# Patient Record
Sex: FEMALE | Race: WHITE | NOT HISPANIC OR LATINO
[De-identification: names, ages, dates, MRNs, and addresses within clinical notes are randomized per-mention and may not be internally consistent; named-entity substitution may affect disease eponyms.]

---

## 2021-11-12 PROBLEM — Z00.00 ENCOUNTER FOR PREVENTIVE HEALTH EXAMINATION: Status: ACTIVE | Noted: 2021-11-12

## 2021-11-29 ENCOUNTER — APPOINTMENT (OUTPATIENT)
Dept: BREAST CENTER | Facility: CLINIC | Age: 41
End: 2021-11-29
Payer: COMMERCIAL

## 2021-11-29 ENCOUNTER — NON-APPOINTMENT (OUTPATIENT)
Age: 41
End: 2021-11-29

## 2021-11-29 VITALS — HEIGHT: 61 IN | BODY MASS INDEX: 21.71 KG/M2 | WEIGHT: 115 LBS

## 2021-11-29 DIAGNOSIS — Z86.011 PERSONAL HISTORY OF BENIGN NEOPLASM OF THE BRAIN: ICD-10-CM

## 2021-11-29 DIAGNOSIS — R92.0 MAMMOGRAPHIC MICROCALCIFICATION FOUND ON DIAGNOSTIC IMAGING OF BREAST: ICD-10-CM

## 2021-11-29 PROCEDURE — 99204 OFFICE O/P NEW MOD 45 MIN: CPT

## 2021-11-29 PROCEDURE — 99072 ADDL SUPL MATRL&STAF TM PHE: CPT

## 2021-11-29 NOTE — PHYSICAL EXAM
[No Supraclavicular Adenopathy] : no supraclavicular adenopathy [No Cervical Adenopathy] : no cervical adenopathy [Clear to Auscultation Bilat] : clear to auscultation bilaterally [Normal Sinus Rhythm] : normal sinus rhythm [Normal S1, S2] : normal S1 and S2 [No Rubs] : no pericardial rub [Examined in the supine and seated position] : examined in the supine and seated position [Symmetrical] : symmetrical [No dominant masses] : no dominant masses in right breast  [No dominant masses] : no dominant masses left breast [No Nipple Retraction] : no left nipple retraction [No Nipple Discharge] : no left nipple discharge [No Axillary Lymphadenopathy] : no left axillary lymphadenopathy [Soft] : abdomen soft [Not Tender] : non-tender [No Hepato-Splenomegaly] : no hepato-splenomegaly [No Rashes] : no rashes [de-identified] : Implants appear intact.

## 2021-11-29 NOTE — HISTORY OF PRESENT ILLNESS
[FreeTextEntry1] : 41-year-old premenopausal woman presents after getting her first mammogram and ultrasound which showed some pleomorphic calcifications in the central left breast.  Ultrasound showed multiple cysts and benign nodules, BI-RADS 3.  Patient denied any breast masses or nipple discharge or bone pain at this time.  Patient underwent a left stereotactic core biopsy which revealed a poorly differentiated ductal carcinoma that was ER/OH positive HER-2 negative with a Ki-67 of 15%.  Patient has no family history of breast cancer although she says she is not sure that they would have told her if she had.  2017 she had a polypectomy for uterine polyps without malignancy.  This is the patient's first breast biopsy and she is never taken hormones.  2016 she had bilateral saline implants placed.

## 2021-12-01 ENCOUNTER — NON-APPOINTMENT (OUTPATIENT)
Age: 41
End: 2021-12-01

## 2021-12-01 DIAGNOSIS — R92.1 MAMMOGRAPHIC CALCIFICATION FOUND ON DIAGNOSTIC IMAGING OF BREAST: ICD-10-CM

## 2021-12-06 ENCOUNTER — APPOINTMENT (OUTPATIENT)
Dept: HEMATOLOGY ONCOLOGY | Facility: CLINIC | Age: 41
End: 2021-12-06

## 2021-12-07 ENCOUNTER — NON-APPOINTMENT (OUTPATIENT)
Age: 41
End: 2021-12-07
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
## 2021-12-09 ENCOUNTER — NON-APPOINTMENT (OUTPATIENT)
Age: 41
End: 2021-12-09

## 2021-12-13 ENCOUNTER — NON-APPOINTMENT (OUTPATIENT)
Age: 41
End: 2021-12-13

## 2021-12-20 ENCOUNTER — NON-APPOINTMENT (OUTPATIENT)
Age: 41
End: 2021-12-20

## 2021-12-20 NOTE — DISCUSSION/SUMMARY
[FreeTextEntry1] : REASON FOR CONSULT\par Jennifer Stout is a 41-year-old female who was contacted on December 20, 2021 for a discussion regarding her negative genetic testing results related to hereditary cancer predisposition. This session was conducted via telephone. \par \par Ms. Stout was originally seen by the Cancer Genetics Service on December 6, 2021 for hereditary cancer predisposition risk assessment due to a new diagnosis of breast cancer. At that time, Ms. Stout decided to pursue genetic testing using [facility]’s breast STAT panel.\par \par INTERVAL HISTORY\par Upon completion of the STAT panel, Ms. Stout was re-contacted on December 13, 2021 regarding her results and consented to pursue additional genetic testing for genes associated with breast, gynecological, and brain cancer given her personal and family history.\par \par TEST RESULTS: NEGATIVE\par NO pathogenic (disease-causing) variants or variants of uncertain significance were detected in any of the following genes [39]:  AIP, ALK, APC, MARION, BARD1, BRCA1, BRCA2, BRIP1, CDH1, CHEK2, DICER1, EPCAM, HRAS, LZTR1, MEN1, MLH1, MSH2, MSH6, NBN, NF1, NF2, PALB2, PHOX2B, PMS2, LLMDK9P, PTCH1, PTEN, RAD51C, RAD51D, RB1, SMARCA4, SMARCB1, SMARCE1, STK11, SUFU, TP53, TSC1, TSC2, and VHL.\par \par RESULTS INTERPRETATION AND ASSESSMENT:\par Given Ms. Stout’s personal and current reported family history of cancer, and her negative genetic test results, the following screening guidelines and risk-reducing recommendations were discussed:\par \par BREAST: \par •	It was discussed Ms. Stout’s surgical plan should not be impacted by these negative genetic testing results.\par •	Long-term management and surveillance should be based on Ms. Stout’s on- or post-treatment protocol as recommended by her breast care team. \par \par OTHER:\par •	In the absence of other indications, Ms. Stout should practice age-appropriate cancer screening of other organ systems as recommended for the general population.\par \par \par We also discussed the limitations of negative results:\par 1.	The cause of Ms. Stout’s personal and family history of cancer remains unknown. The cancer(s) may have developed randomly, or due to environmental factors.  \par 2.	This negative result does not completely rule out a hereditary basis for the reported personal and/or family history due to limitations in technology or a variant being present in an unidentified gene. \par 3.	Variants in other genes would not be identified by this analysis, so this negative result does not rule out the likelihood of having a mutation in a different hereditary cancer gene or the possibility of ever developing cancer.\par 4.	It is possible there is a hereditary cancer predisposition gene mutation in the family, but the patient did not inherit it. \par \par We informed Ms. Stout that our knowledge of genetics and inherited cancer conditions is changing rapidly. Therefore, we recommended that Ms. Stout contact our office, every 2 to 3 years, to discuss relevant advances in cancer genetics.  We emphasized the importance of re-contacting us with updates regarding her personal and family history of cancer as well as any updates regarding additional cancer genetic test results performed for the patient and/or family members.  Such updates could possibly change our risk assessment and recommendations. \par \par PLAN:\par 1.	These results do not change Ms. Stout’s medical management. Long-term management and surveillance should be based on the patient’s on- or post-treatment protocol as recommended by her breast care team (and general population guidelines for other cancers).\par 2.	A copy of genetic testing results and clinic note will be sent to Ms. Stout.\par 3.	Ms. Stout was encouraged to contact us every 2-3 years to discuss relevant advances in cancer genetics, or sooner if there are any changes in her personal or family history of cancer.\par \par \par For any additional questions please call Cancer Genetics at [phone]. \par \par \par Aileen Perez MS, [facility] [place]\par Genetic Counselor, Cancer Genetics\par \par \par CC: \par Jennifer Girish\par Abner Ross MD\par \par \par \par \par

## 2022-01-21 ENCOUNTER — RESULT REVIEW (OUTPATIENT)
Age: 42
End: 2022-01-21

## 2022-01-31 ENCOUNTER — APPOINTMENT (OUTPATIENT)
Dept: BREAST CENTER | Facility: CLINIC | Age: 42
End: 2022-01-31
Payer: COMMERCIAL

## 2022-01-31 VITALS
BODY MASS INDEX: 21.73 KG/M2 | WEIGHT: 115 LBS | DIASTOLIC BLOOD PRESSURE: 82 MMHG | SYSTOLIC BLOOD PRESSURE: 139 MMHG | HEART RATE: 72 BPM | OXYGEN SATURATION: 99 %

## 2022-01-31 PROCEDURE — 99214 OFFICE O/P EST MOD 30 MIN: CPT

## 2022-02-01 NOTE — PHYSICAL EXAM
[de-identified] : Patient is healing well with some ecchymosis status post bilateral breast biopsies.  Implants appear intact.

## 2022-02-01 NOTE — HISTORY OF PRESENT ILLNESS
[FreeTextEntry1] : 41-year-old premenopausal woman presents after getting her first mammogram and ultrasound which showed some pleomorphic calcifications in the central left breast.  Ultrasound showed multiple cysts and benign nodules, BI-RADS 3.  Patient denied any breast masses or nipple discharge or bone pain at this time.  Patient underwent a left stereotactic core biopsy which revealed a poorly differentiated ductal carcinoma that was ER/MN positive HER-2 negative with a Ki-67 of 15%.  Patient has no family history of breast cancer although she says she is not sure that they would have told her if she had.  2017 she had a polypectomy for uterine polyps without malignancy.  This is the patient's first breast biopsy and she is never taken hormones.  2016 she had bilateral saline implants placed.\par \par Since last seeing the patient patient gene tested negative, had a left breast stereotactic core biopsy, benign and a right breast MRI guided core biopsy benign.  Patient comes in now for discussion of treating her breast cancer.\par

## 2022-03-11 ENCOUNTER — APPOINTMENT (OUTPATIENT)
Dept: BREAST CENTER | Facility: HOSPITAL | Age: 42
End: 2022-03-11

## 2022-03-22 ENCOUNTER — APPOINTMENT (OUTPATIENT)
Dept: BREAST CENTER | Facility: CLINIC | Age: 42
End: 2022-03-22
Payer: COMMERCIAL

## 2022-03-22 VITALS
HEART RATE: 83 BPM | SYSTOLIC BLOOD PRESSURE: 114 MMHG | DIASTOLIC BLOOD PRESSURE: 72 MMHG | WEIGHT: 118 LBS | HEIGHT: 61 IN | BODY MASS INDEX: 22.28 KG/M2

## 2022-03-22 PROCEDURE — 99024 POSTOP FOLLOW-UP VISIT: CPT

## 2022-03-22 NOTE — HISTORY OF PRESENT ILLNESS
[FreeTextEntry1] : 2/22 left partial mastectomy with sentinel node biopsy\par Poorly differentiated ductal carcinoma, 23 mm, ER/MD positive, HER-2 negative, Ki-67 of 75%\par Margins were negative, 0/4 nodes\par T2 N0 M0, stage IIa\par \par Patient is doing well postoperatively, pain under control.\par \par \par 41-year-old premenopausal woman presents after getting her first mammogram and ultrasound which showed some pleomorphic calcifications in the central left breast.  Ultrasound showed multiple cysts and benign nodules, BI-RADS 3.  Patient denied any breast masses or nipple discharge or bone pain at this time.  Patient underwent a left stereotactic core biopsy which revealed a poorly differentiated ductal carcinoma that was ER/MD positive HER-2 negative with a Ki-67 of 15%.  Patient has no family history of breast cancer although she says she is not sure that they would have told her if she had.  2017 she had a polypectomy for uterine polyps without malignancy.  This is the patient's first breast biopsy and she is never taken hormones.  2016 she had bilateral saline implants placed.\par \par Since last seeing the patient patient gene tested negative, had a left breast stereotactic core biopsy, benign and a right breast MRI guided core biopsy benign.  Patient comes in now for discussion of treating her breast cancer.\par

## 2022-03-27 ENCOUNTER — FORM ENCOUNTER (OUTPATIENT)
Age: 42
End: 2022-03-27

## 2022-03-29 ENCOUNTER — RESULT REVIEW (OUTPATIENT)
Age: 42
End: 2022-03-29

## 2022-03-29 ENCOUNTER — APPOINTMENT (OUTPATIENT)
Dept: HEMATOLOGY ONCOLOGY | Facility: CLINIC | Age: 42
End: 2022-03-29
Payer: COMMERCIAL

## 2022-03-29 VITALS
TEMPERATURE: 99.1 F | WEIGHT: 118.38 LBS | HEIGHT: 61 IN | SYSTOLIC BLOOD PRESSURE: 120 MMHG | OXYGEN SATURATION: 99 % | RESPIRATION RATE: 16 BRPM | BODY MASS INDEX: 22.35 KG/M2 | HEART RATE: 83 BPM | DIASTOLIC BLOOD PRESSURE: 67 MMHG

## 2022-03-29 PROCEDURE — 99205 OFFICE O/P NEW HI 60 MIN: CPT | Mod: 25

## 2022-03-29 PROCEDURE — 36415 COLL VENOUS BLD VENIPUNCTURE: CPT
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
## 2022-04-05 ENCOUNTER — NON-APPOINTMENT (OUTPATIENT)
Age: 42
End: 2022-04-05

## 2022-04-05 ENCOUNTER — RESULT REVIEW (OUTPATIENT)
Age: 42
End: 2022-04-05

## 2022-04-07 ENCOUNTER — NON-APPOINTMENT (OUTPATIENT)
Age: 42
End: 2022-04-07

## 2022-04-15 ENCOUNTER — APPOINTMENT (OUTPATIENT)
Dept: HEMATOLOGY ONCOLOGY | Facility: CLINIC | Age: 42
End: 2022-04-15
Payer: COMMERCIAL

## 2022-04-17 ENCOUNTER — RESULT REVIEW (OUTPATIENT)
Age: 42
End: 2022-04-17

## 2022-04-18 ENCOUNTER — RESULT REVIEW (OUTPATIENT)
Age: 42
End: 2022-04-18

## 2022-04-20 ENCOUNTER — RESULT REVIEW (OUTPATIENT)
Age: 42
End: 2022-04-20

## 2022-04-22 ENCOUNTER — APPOINTMENT (OUTPATIENT)
Dept: HEMATOLOGY ONCOLOGY | Facility: CLINIC | Age: 42
End: 2022-04-22
Payer: COMMERCIAL

## 2022-04-22 ENCOUNTER — NON-APPOINTMENT (OUTPATIENT)
Age: 42
End: 2022-04-22

## 2022-04-22 VITALS
DIASTOLIC BLOOD PRESSURE: 86 MMHG | SYSTOLIC BLOOD PRESSURE: 118 MMHG | HEART RATE: 81 BPM | OXYGEN SATURATION: 98 % | TEMPERATURE: 98.3 F | RESPIRATION RATE: 18 BRPM | BODY MASS INDEX: 22.28 KG/M2 | HEIGHT: 60.98 IN | WEIGHT: 118 LBS

## 2022-04-22 DIAGNOSIS — Z95.828 PRESENCE OF OTHER VASCULAR IMPLANTS AND GRAFTS: ICD-10-CM

## 2022-04-22 PROCEDURE — 99215 OFFICE O/P EST HI 40 MIN: CPT | Mod: 25
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
## 2022-04-22 NOTE — PHYSICAL EXAM
[Normal] : bilateral breasts without nipple retraction, skin dimpling or palpable masses; the bilateral axillae are without adenopathy [de-identified] : +mediport with swelling, red, and minimal tenderness - no signs of infection

## 2022-04-25 ENCOUNTER — NON-APPOINTMENT (OUTPATIENT)
Age: 42
End: 2022-04-25

## 2022-04-29 ENCOUNTER — APPOINTMENT (OUTPATIENT)
Dept: HEMATOLOGY ONCOLOGY | Facility: CLINIC | Age: 42
End: 2022-04-29
Payer: COMMERCIAL

## 2022-04-29 ENCOUNTER — RESULT REVIEW (OUTPATIENT)
Age: 42
End: 2022-04-29

## 2022-04-29 VITALS
BODY MASS INDEX: 21.5 KG/M2 | WEIGHT: 113.9 LBS | HEIGHT: 60.98 IN | RESPIRATION RATE: 18 BRPM | TEMPERATURE: 98.2 F | HEART RATE: 91 BPM | DIASTOLIC BLOOD PRESSURE: 101 MMHG | OXYGEN SATURATION: 98 % | SYSTOLIC BLOOD PRESSURE: 144 MMHG

## 2022-04-29 PROCEDURE — 99214 OFFICE O/P EST MOD 30 MIN: CPT | Mod: 25
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
## 2022-05-02 NOTE — PHYSICAL EXAM
[Restricted in physically strenuous activity but ambulatory and able to carry out work of a light or sedentary nature] : Status 1- Restricted in physically strenuous activity but ambulatory and able to carry out work of a light or sedentary nature, e.g., light house work, office work [Normal] : affect appropriate [de-identified] : +mediport without any swelling, redness. Tenderness in right neck

## 2022-05-06 ENCOUNTER — RESULT REVIEW (OUTPATIENT)
Age: 42
End: 2022-05-06

## 2022-05-06 ENCOUNTER — APPOINTMENT (OUTPATIENT)
Dept: BREAST CENTER | Facility: CLINIC | Age: 42
End: 2022-05-06
Payer: COMMERCIAL

## 2022-05-06 ENCOUNTER — APPOINTMENT (OUTPATIENT)
Dept: HEMATOLOGY ONCOLOGY | Facility: CLINIC | Age: 42
End: 2022-05-06
Payer: COMMERCIAL

## 2022-05-06 VITALS
HEIGHT: 60 IN | WEIGHT: 119 LBS | OXYGEN SATURATION: 95 % | SYSTOLIC BLOOD PRESSURE: 130 MMHG | DIASTOLIC BLOOD PRESSURE: 90 MMHG | BODY MASS INDEX: 23.36 KG/M2 | HEART RATE: 110 BPM

## 2022-05-06 VITALS
HEIGHT: 60.98 IN | OXYGEN SATURATION: 99 % | SYSTOLIC BLOOD PRESSURE: 111 MMHG | WEIGHT: 119 LBS | TEMPERATURE: 98.1 F | BODY MASS INDEX: 22.47 KG/M2 | DIASTOLIC BLOOD PRESSURE: 59 MMHG | HEART RATE: 94 BPM | RESPIRATION RATE: 18 BRPM

## 2022-05-06 PROCEDURE — 99215 OFFICE O/P EST HI 40 MIN: CPT | Mod: 25

## 2022-05-06 PROCEDURE — 99024 POSTOP FOLLOW-UP VISIT: CPT

## 2022-05-06 PROCEDURE — 36415 COLL VENOUS BLD VENIPUNCTURE: CPT

## 2022-05-06 NOTE — PHYSICAL EXAM
[Restricted in physically strenuous activity but ambulatory and able to carry out work of a light or sedentary nature] : Status 1- Restricted in physically strenuous activity but ambulatory and able to carry out work of a light or sedentary nature, e.g., light house work, office work [Normal] : affect appropriate [de-identified] : +mediport without any swelling, redness. Mild tenderness right neck. No swelling [de-identified] : left UIQ nodule (likely suture/ biopsy clip). Right sides clip lateral to areola ~9:00 position. No other palpable masses or axillary nodes bilaterally
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
## 2022-05-09 NOTE — HISTORY OF PRESENT ILLNESS
[FreeTextEntry1] : 2/22 left partial mastectomy with sentinel node biopsy\par Poorly differentiated ductal carcinoma, 23 mm, ER/MN positive, HER-2 negative, Ki-67 of 75%\par Margins were negative, 0/4 nodes\par T2 N0 M0, stage IIa\par \par Patient i was referred to me by her medical oncologist because there is some sutures sticking out underneath the sutures.\par \par \par 41-year-old premenopausal woman presents after getting her first mammogram and ultrasound which showed some pleomorphic calcifications in the central left breast.  Ultrasound showed multiple cysts and benign nodules, BI-RADS 3.  Patient denied any breast masses or nipple discharge or bone pain at this time.  Patient underwent a left stereotactic core biopsy which revealed a poorly differentiated ductal carcinoma that was ER/MN positive HER-2 negative with a Ki-67 of 15%.  Patient has no family history of breast cancer although she says she is not sure that they would have told her if she had.  2017 she had a polypectomy for uterine polyps without malignancy.  This is the patient's first breast biopsy and she is never taken hormones.  2016 she had bilateral saline implants placed.\par \par Since last seeing the patient patient gene tested negative, had a left breast stereotactic core biopsy, benign and a right breast MRI guided core biopsy benign.  Patient comes in now for discussion of treating her breast cancer.\par

## 2022-05-09 NOTE — PHYSICAL EXAM
[de-identified] : Under each incision there is this area of small sutures underneath the skin that are soft and not breaking integrity of the skin.  No signs of infection.

## 2022-05-20 ENCOUNTER — RESULT REVIEW (OUTPATIENT)
Age: 42
End: 2022-05-20

## 2022-05-20 ENCOUNTER — APPOINTMENT (OUTPATIENT)
Dept: HEMATOLOGY ONCOLOGY | Facility: CLINIC | Age: 42
End: 2022-05-20
Payer: COMMERCIAL

## 2022-05-20 ENCOUNTER — NON-APPOINTMENT (OUTPATIENT)
Age: 42
End: 2022-05-20

## 2022-05-20 VITALS
DIASTOLIC BLOOD PRESSURE: 85 MMHG | HEART RATE: 81 BPM | WEIGHT: 121.06 LBS | SYSTOLIC BLOOD PRESSURE: 135 MMHG | OXYGEN SATURATION: 98 % | HEIGHT: 60 IN | RESPIRATION RATE: 16 BRPM | BODY MASS INDEX: 23.77 KG/M2 | TEMPERATURE: 98.2 F

## 2022-05-20 PROCEDURE — 36415 COLL VENOUS BLD VENIPUNCTURE: CPT

## 2022-05-20 PROCEDURE — 99214 OFFICE O/P EST MOD 30 MIN: CPT | Mod: 25
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
## 2022-05-20 NOTE — PHYSICAL EXAM
[Restricted in physically strenuous activity but ambulatory and able to carry out work of a light or sedentary nature] : Status 1- Restricted in physically strenuous activity but ambulatory and able to carry out work of a light or sedentary nature, e.g., light house work, office work [Normal] : affect appropriate [de-identified] : +mediport without any swelling, redness. Mild tenderness right neck.  [de-identified] : left UIQ nodule (likely suture/ biopsy clip). Right sides clip lateral to areola ~9:00 position. No other palpable masses or axillary nodes bilaterally

## 2022-06-03 ENCOUNTER — APPOINTMENT (OUTPATIENT)
Dept: HEMATOLOGY ONCOLOGY | Facility: CLINIC | Age: 42
End: 2022-06-03
Payer: COMMERCIAL

## 2022-06-03 ENCOUNTER — RESULT REVIEW (OUTPATIENT)
Age: 42
End: 2022-06-03

## 2022-06-03 VITALS
SYSTOLIC BLOOD PRESSURE: 106 MMHG | HEIGHT: 60 IN | DIASTOLIC BLOOD PRESSURE: 67 MMHG | OXYGEN SATURATION: 98 % | RESPIRATION RATE: 16 BRPM | WEIGHT: 120.13 LBS | HEART RATE: 76 BPM | BODY MASS INDEX: 23.58 KG/M2 | TEMPERATURE: 97.8 F

## 2022-06-03 PROCEDURE — 99215 OFFICE O/P EST HI 40 MIN: CPT | Mod: 25

## 2022-06-03 PROCEDURE — 36415 COLL VENOUS BLD VENIPUNCTURE: CPT

## 2022-06-03 NOTE — PHYSICAL EXAM
[Restricted in physically strenuous activity but ambulatory and able to carry out work of a light or sedentary nature] : Status 1- Restricted in physically strenuous activity but ambulatory and able to carry out work of a light or sedentary nature, e.g., light house work, office work [Normal] : affect appropriate [de-identified] : +mediport without any swelling, redness. Mild tenderness right neck.  [de-identified] : left UIQ nodule (likely suture/ biopsy clip). Right sides clip lateral to areola ~9:00 position. No other palpable masses or axillary nodes bilaterally
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
## 2022-06-14 ENCOUNTER — RESULT REVIEW (OUTPATIENT)
Age: 42
End: 2022-06-14

## 2022-06-17 ENCOUNTER — APPOINTMENT (OUTPATIENT)
Dept: HEMATOLOGY ONCOLOGY | Facility: CLINIC | Age: 42
End: 2022-06-17
Payer: COMMERCIAL

## 2022-06-17 ENCOUNTER — RESULT REVIEW (OUTPATIENT)
Age: 42
End: 2022-06-17

## 2022-06-17 VITALS
SYSTOLIC BLOOD PRESSURE: 110 MMHG | DIASTOLIC BLOOD PRESSURE: 71 MMHG | OXYGEN SATURATION: 98 % | TEMPERATURE: 98.5 F | WEIGHT: 121 LBS | HEART RATE: 83 BPM | HEIGHT: 60 IN | BODY MASS INDEX: 23.75 KG/M2 | RESPIRATION RATE: 16 BRPM

## 2022-06-17 PROCEDURE — 36415 COLL VENOUS BLD VENIPUNCTURE: CPT

## 2022-06-17 PROCEDURE — 99215 OFFICE O/P EST HI 40 MIN: CPT | Mod: 25
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
## 2022-06-17 NOTE — PHYSICAL EXAM
[Restricted in physically strenuous activity but ambulatory and able to carry out work of a light or sedentary nature] : Status 1- Restricted in physically strenuous activity but ambulatory and able to carry out work of a light or sedentary nature, e.g., light house work, office work [Normal] : affect appropriate [de-identified] : +mediport without any swelling, redness. Mild tenderness right neck.  [de-identified] : left UIQ nodule (likely suture/ biopsy clip). Right sides clip lateral to areola ~9:00 position. No other palpable masses or axillary nodes bilaterally

## 2022-06-24 ENCOUNTER — RESULT REVIEW (OUTPATIENT)
Age: 42
End: 2022-06-24

## 2022-06-24 ENCOUNTER — APPOINTMENT (OUTPATIENT)
Dept: HEMATOLOGY ONCOLOGY | Facility: CLINIC | Age: 42
End: 2022-06-24
Payer: COMMERCIAL

## 2022-06-24 VITALS
SYSTOLIC BLOOD PRESSURE: 103 MMHG | WEIGHT: 118.5 LBS | TEMPERATURE: 97.4 F | BODY MASS INDEX: 23.14 KG/M2 | DIASTOLIC BLOOD PRESSURE: 63 MMHG | RESPIRATION RATE: 18 BRPM | OXYGEN SATURATION: 99 % | HEART RATE: 80 BPM

## 2022-06-24 PROCEDURE — 36415 COLL VENOUS BLD VENIPUNCTURE: CPT

## 2022-06-24 PROCEDURE — 99214 OFFICE O/P EST MOD 30 MIN: CPT | Mod: 25
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
## 2022-06-24 NOTE — PHYSICAL EXAM
[Restricted in physically strenuous activity but ambulatory and able to carry out work of a light or sedentary nature] : Status 1- Restricted in physically strenuous activity but ambulatory and able to carry out work of a light or sedentary nature, e.g., light house work, office work [Normal] : affect appropriate [de-identified] : +mediport without any swelling, redness. Mild tenderness right neck.  [de-identified] : left UIQ nodule (likely suture/ biopsy clip). Right sides clip lateral to areola ~9:00 position. No other palpable masses or axillary nodes bilaterally
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
## 2022-07-01 ENCOUNTER — APPOINTMENT (OUTPATIENT)
Dept: HEMATOLOGY ONCOLOGY | Facility: CLINIC | Age: 42
End: 2022-07-01

## 2022-07-01 ENCOUNTER — RESULT REVIEW (OUTPATIENT)
Age: 42
End: 2022-07-01

## 2022-07-01 ENCOUNTER — APPOINTMENT (OUTPATIENT)
Dept: BREAST CENTER | Facility: CLINIC | Age: 42
End: 2022-07-01

## 2022-07-01 VITALS
DIASTOLIC BLOOD PRESSURE: 73 MMHG | HEIGHT: 60 IN | HEART RATE: 83 BPM | TEMPERATURE: 98.3 F | BODY MASS INDEX: 23 KG/M2 | WEIGHT: 117.13 LBS | SYSTOLIC BLOOD PRESSURE: 123 MMHG | RESPIRATION RATE: 18 BRPM | OXYGEN SATURATION: 97 %

## 2022-07-01 PROCEDURE — 36415 COLL VENOUS BLD VENIPUNCTURE: CPT

## 2022-07-01 PROCEDURE — 99214 OFFICE O/P EST MOD 30 MIN: CPT | Mod: 25

## 2022-07-01 RX ORDER — DEXAMETHASONE 4 MG/1
4 TABLET ORAL
Qty: 8 | Refills: 0 | Status: DISCONTINUED | COMMUNITY
Start: 2022-04-22 | End: 2022-07-01

## 2022-07-01 NOTE — PHYSICAL EXAM
[de-identified] : +mediport without any swelling, redness. Mild tenderness right neck.  [de-identified] : left UIQ nodule (likely suture/ biopsy clip). Right sides clip lateral to areola ~9:00 position. No other palpable masses or axillary nodes bilaterally
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
## 2022-07-08 ENCOUNTER — RESULT REVIEW (OUTPATIENT)
Age: 42
End: 2022-07-08

## 2022-07-08 ENCOUNTER — APPOINTMENT (OUTPATIENT)
Dept: HEMATOLOGY ONCOLOGY | Facility: CLINIC | Age: 42
End: 2022-07-08

## 2022-07-08 VITALS
RESPIRATION RATE: 16 BRPM | HEIGHT: 60 IN | OXYGEN SATURATION: 97 % | SYSTOLIC BLOOD PRESSURE: 97 MMHG | WEIGHT: 120.8 LBS | BODY MASS INDEX: 23.71 KG/M2 | DIASTOLIC BLOOD PRESSURE: 61 MMHG | TEMPERATURE: 97.6 F | HEART RATE: 93 BPM

## 2022-07-08 DIAGNOSIS — K59.00 CONSTIPATION, UNSPECIFIED: ICD-10-CM

## 2022-07-08 PROCEDURE — 99214 OFFICE O/P EST MOD 30 MIN: CPT | Mod: 25

## 2022-07-08 PROCEDURE — 36415 COLL VENOUS BLD VENIPUNCTURE: CPT
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
## 2022-07-08 NOTE — PHYSICAL EXAM
[Restricted in physically strenuous activity but ambulatory and able to carry out work of a light or sedentary nature] : Status 1- Restricted in physically strenuous activity but ambulatory and able to carry out work of a light or sedentary nature, e.g., light house work, office work [Normal] : affect appropriate [de-identified] : +mediport without any swelling, redness. Mild tenderness right neck.  [de-identified] : left UIQ nodule (likely suture/ biopsy clip). Right sides clip lateral to areola ~9:00 position. No other palpable masses or axillary nodes bilaterally

## 2022-07-15 ENCOUNTER — APPOINTMENT (OUTPATIENT)
Dept: HEMATOLOGY ONCOLOGY | Facility: CLINIC | Age: 42
End: 2022-07-15

## 2022-07-15 ENCOUNTER — RESULT REVIEW (OUTPATIENT)
Age: 42
End: 2022-07-15

## 2022-07-15 VITALS
OXYGEN SATURATION: 100 % | SYSTOLIC BLOOD PRESSURE: 119 MMHG | TEMPERATURE: 97.6 F | BODY MASS INDEX: 23.8 KG/M2 | HEART RATE: 95 BPM | WEIGHT: 121.2 LBS | HEIGHT: 60 IN | RESPIRATION RATE: 16 BRPM | DIASTOLIC BLOOD PRESSURE: 81 MMHG

## 2022-07-15 PROCEDURE — 99214 OFFICE O/P EST MOD 30 MIN: CPT | Mod: 25

## 2022-07-15 PROCEDURE — 36415 COLL VENOUS BLD VENIPUNCTURE: CPT

## 2022-07-15 NOTE — PHYSICAL EXAM
[Restricted in physically strenuous activity but ambulatory and able to carry out work of a light or sedentary nature] : Status 1- Restricted in physically strenuous activity but ambulatory and able to carry out work of a light or sedentary nature, e.g., light house work, office work [Normal] : affect appropriate [de-identified] : +mediport without any swelling, redness. Mild tenderness right neck.
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
## 2022-07-22 ENCOUNTER — RESULT REVIEW (OUTPATIENT)
Age: 42
End: 2022-07-22

## 2022-07-22 ENCOUNTER — APPOINTMENT (OUTPATIENT)
Dept: HEMATOLOGY ONCOLOGY | Facility: CLINIC | Age: 42
End: 2022-07-22

## 2022-07-22 VITALS
BODY MASS INDEX: 24.21 KG/M2 | HEART RATE: 84 BPM | WEIGHT: 123.3 LBS | DIASTOLIC BLOOD PRESSURE: 75 MMHG | SYSTOLIC BLOOD PRESSURE: 109 MMHG | OXYGEN SATURATION: 96 % | RESPIRATION RATE: 16 BRPM | HEIGHT: 60 IN | TEMPERATURE: 97.2 F

## 2022-07-29 ENCOUNTER — RESULT REVIEW (OUTPATIENT)
Age: 42
End: 2022-07-29

## 2022-07-29 ENCOUNTER — APPOINTMENT (OUTPATIENT)
Dept: HEMATOLOGY ONCOLOGY | Facility: CLINIC | Age: 42
End: 2022-07-29

## 2022-07-29 ENCOUNTER — NON-APPOINTMENT (OUTPATIENT)
Age: 42
End: 2022-07-29

## 2022-07-29 VITALS
OXYGEN SATURATION: 98 % | DIASTOLIC BLOOD PRESSURE: 89 MMHG | SYSTOLIC BLOOD PRESSURE: 134 MMHG | WEIGHT: 118.25 LBS | TEMPERATURE: 98.3 F | RESPIRATION RATE: 18 BRPM | BODY MASS INDEX: 23.22 KG/M2 | HEART RATE: 87 BPM | HEIGHT: 60 IN

## 2022-07-29 DIAGNOSIS — R53.83 OTHER FATIGUE: ICD-10-CM

## 2022-07-29 PROCEDURE — 99215 OFFICE O/P EST HI 40 MIN: CPT | Mod: 25

## 2022-07-29 PROCEDURE — 36415 COLL VENOUS BLD VENIPUNCTURE: CPT
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
## 2022-07-29 NOTE — PHYSICAL EXAM
[de-identified] : +mediport without any swelling, redness. Mild tenderness right neck.  [de-identified] : rash with scabs from scratching over the forearms and feet
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
## 2022-08-05 ENCOUNTER — APPOINTMENT (OUTPATIENT)
Dept: HEMATOLOGY ONCOLOGY | Facility: CLINIC | Age: 42
End: 2022-08-05

## 2022-08-05 ENCOUNTER — RESULT REVIEW (OUTPATIENT)
Age: 42
End: 2022-08-05

## 2022-08-05 ENCOUNTER — APPOINTMENT (OUTPATIENT)
Dept: BREAST CENTER | Facility: CLINIC | Age: 42
End: 2022-08-05

## 2022-08-05 VITALS
HEIGHT: 60 IN | WEIGHT: 116.31 LBS | OXYGEN SATURATION: 100 % | TEMPERATURE: 97.4 F | HEART RATE: 102 BPM | RESPIRATION RATE: 16 BRPM | DIASTOLIC BLOOD PRESSURE: 85 MMHG | SYSTOLIC BLOOD PRESSURE: 127 MMHG | BODY MASS INDEX: 22.84 KG/M2

## 2022-08-05 DIAGNOSIS — R11.10 VOMITING, UNSPECIFIED: ICD-10-CM

## 2022-08-05 PROCEDURE — 36415 COLL VENOUS BLD VENIPUNCTURE: CPT

## 2022-08-05 PROCEDURE — 99213 OFFICE O/P EST LOW 20 MIN: CPT

## 2022-08-05 PROCEDURE — 99214 OFFICE O/P EST MOD 30 MIN: CPT | Mod: 25

## 2022-08-05 NOTE — PHYSICAL EXAM
[Restricted in physically strenuous activity but ambulatory and able to carry out work of a light or sedentary nature] : Status 1- Restricted in physically strenuous activity but ambulatory and able to carry out work of a light or sedentary nature, e.g., light house work, office work [Normal] : affect appropriate [de-identified] : +mediport without any swelling, redness. Mild tenderness right neck.  [de-identified] : rash with scabs from scratching over the forearms and feet

## 2022-08-05 NOTE — HISTORY OF PRESENT ILLNESS
[FreeTextEntry1] : 2/22 left partial mastectomy with sentinel node biopsy\par Poorly differentiated ductal carcinoma, 23 mm, ER/SD positive, HER-2 negative, Ki-67 of 75%\par Margins were negative, 0/4 nodes\par T2 N0 M0, stage IIa\par \par Patient is tolerating her chemotherapy which she is in the middle and has no breast masses or bone pain.\par \par \par 41-year-old premenopausal woman presents after getting her first mammogram and ultrasound which showed some pleomorphic calcifications in the central left breast.  Ultrasound showed multiple cysts and benign nodules, BI-RADS 3.  Patient denied any breast masses or nipple discharge or bone pain at this time.  Patient underwent a left stereotactic core biopsy which revealed a poorly differentiated ductal carcinoma that was ER/SD positive HER-2 negative with a Ki-67 of 15%.  Patient has no family history of breast cancer although she says she is not sure that they would have told her if she had.  2017 she had a polypectomy for uterine polyps without malignancy.  This is the patient's first breast biopsy and she is never taken hormones.  2016 she had bilateral saline implants placed.\par \par Since last seeing the patient patient gene tested negative, had a left breast stereotactic core biopsy, benign and a right breast MRI guided core biopsy benign.  Patient comes in now for discussion of treating her breast cancer.\par
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
## 2022-08-12 ENCOUNTER — RESULT REVIEW (OUTPATIENT)
Age: 42
End: 2022-08-12

## 2022-08-12 ENCOUNTER — APPOINTMENT (OUTPATIENT)
Dept: HEMATOLOGY ONCOLOGY | Facility: CLINIC | Age: 42
End: 2022-08-12

## 2022-08-12 VITALS
HEART RATE: 85 BPM | RESPIRATION RATE: 16 BRPM | HEIGHT: 60 IN | DIASTOLIC BLOOD PRESSURE: 70 MMHG | TEMPERATURE: 97.3 F | SYSTOLIC BLOOD PRESSURE: 118 MMHG | OXYGEN SATURATION: 98 % | BODY MASS INDEX: 23 KG/M2 | WEIGHT: 117.13 LBS

## 2022-08-12 PROCEDURE — 99214 OFFICE O/P EST MOD 30 MIN: CPT | Mod: 25

## 2022-08-12 PROCEDURE — 36415 COLL VENOUS BLD VENIPUNCTURE: CPT

## 2022-08-12 NOTE — PHYSICAL EXAM
[Restricted in physically strenuous activity but ambulatory and able to carry out work of a light or sedentary nature] : Status 1- Restricted in physically strenuous activity but ambulatory and able to carry out work of a light or sedentary nature, e.g., light house work, office work [Normal] : affect appropriate [de-identified] : +mediport without any swelling, redness. Mild tenderness right neck.  [de-identified] : rash with scabs from scratching over the forearms and feet
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
## 2022-08-19 ENCOUNTER — APPOINTMENT (OUTPATIENT)
Dept: HEMATOLOGY ONCOLOGY | Facility: CLINIC | Age: 42
End: 2022-08-19

## 2022-08-19 ENCOUNTER — RESULT REVIEW (OUTPATIENT)
Age: 42
End: 2022-08-19

## 2022-08-19 VITALS
TEMPERATURE: 97.7 F | HEIGHT: 60 IN | HEART RATE: 77 BPM | BODY MASS INDEX: 23.58 KG/M2 | WEIGHT: 120.13 LBS | OXYGEN SATURATION: 99 % | SYSTOLIC BLOOD PRESSURE: 148 MMHG | RESPIRATION RATE: 16 BRPM | DIASTOLIC BLOOD PRESSURE: 91 MMHG

## 2022-08-19 DIAGNOSIS — Z79.899 OTHER LONG TERM (CURRENT) DRUG THERAPY: ICD-10-CM

## 2022-08-19 DIAGNOSIS — R51.9 HEADACHE, UNSPECIFIED: ICD-10-CM

## 2022-08-19 PROCEDURE — 36415 COLL VENOUS BLD VENIPUNCTURE: CPT

## 2022-08-19 PROCEDURE — 99215 OFFICE O/P EST HI 40 MIN: CPT | Mod: 25
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
## 2022-08-19 NOTE — PHYSICAL EXAM
[Restricted in physically strenuous activity but ambulatory and able to carry out work of a light or sedentary nature] : Status 1- Restricted in physically strenuous activity but ambulatory and able to carry out work of a light or sedentary nature, e.g., light house work, office work [Normal] : affect appropriate [de-identified] : +mediport without any swelling, redness. Mild tenderness right neck.  [de-identified] : rash with scabs from scratching over the forearms and feet

## 2022-08-26 ENCOUNTER — RESULT REVIEW (OUTPATIENT)
Age: 42
End: 2022-08-26

## 2022-08-26 ENCOUNTER — APPOINTMENT (OUTPATIENT)
Dept: HEMATOLOGY ONCOLOGY | Facility: CLINIC | Age: 42
End: 2022-08-26

## 2022-08-26 VITALS
WEIGHT: 118.3 LBS | RESPIRATION RATE: 16 BRPM | HEIGHT: 60 IN | TEMPERATURE: 98.8 F | SYSTOLIC BLOOD PRESSURE: 151 MMHG | HEART RATE: 105 BPM | DIASTOLIC BLOOD PRESSURE: 81 MMHG | BODY MASS INDEX: 23.23 KG/M2 | OXYGEN SATURATION: 99 %

## 2022-08-26 DIAGNOSIS — Z51.11 ENCOUNTER FOR ANTINEOPLASTIC CHEMOTHERAPY: ICD-10-CM

## 2022-08-26 DIAGNOSIS — R21 RASH AND OTHER NONSPECIFIC SKIN ERUPTION: ICD-10-CM

## 2022-08-26 PROCEDURE — 36415 COLL VENOUS BLD VENIPUNCTURE: CPT

## 2022-08-26 PROCEDURE — 99215 OFFICE O/P EST HI 40 MIN: CPT | Mod: 25

## 2022-08-29 PROBLEM — Z51.11 ENCOUNTER FOR CHEMOTHERAPY MANAGEMENT: Status: ACTIVE | Noted: 2022-04-22

## 2022-08-29 PROBLEM — R21 RASH: Status: ACTIVE | Noted: 2022-07-29
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
## 2022-08-29 NOTE — PHYSICAL EXAM
[Restricted in physically strenuous activity but ambulatory and able to carry out work of a light or sedentary nature] : Status 1- Restricted in physically strenuous activity but ambulatory and able to carry out work of a light or sedentary nature, e.g., light house work, office work [Normal] : bilateral breasts without nipple retraction, skin dimpling or palpable masses; the bilateral axillae are without adenopathy [de-identified] : Hyperpigmented area in the left lower eyelid [de-identified] : +mediport without any swelling, redness. [de-identified] : rash with scabs from scratching over the forearms and feet
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
## 2022-09-02 ENCOUNTER — RESULT REVIEW (OUTPATIENT)
Age: 42
End: 2022-09-02

## 2022-09-02 ENCOUNTER — APPOINTMENT (OUTPATIENT)
Dept: HEMATOLOGY ONCOLOGY | Facility: CLINIC | Age: 42
End: 2022-09-02

## 2022-09-02 VITALS
WEIGHT: 124.4 LBS | OXYGEN SATURATION: 99 % | HEART RATE: 84 BPM | HEIGHT: 60 IN | BODY MASS INDEX: 24.42 KG/M2 | SYSTOLIC BLOOD PRESSURE: 102 MMHG | TEMPERATURE: 98.5 F | RESPIRATION RATE: 16 BRPM | DIASTOLIC BLOOD PRESSURE: 69 MMHG

## 2022-09-02 DIAGNOSIS — R23.2 FLUSHING: ICD-10-CM

## 2022-09-02 DIAGNOSIS — Z51.11 ENCOUNTER FOR ANTINEOPLASTIC CHEMOTHERAPY: ICD-10-CM

## 2022-09-02 PROCEDURE — 99214 OFFICE O/P EST MOD 30 MIN: CPT | Mod: 25

## 2022-09-02 PROCEDURE — 36415 COLL VENOUS BLD VENIPUNCTURE: CPT
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
## 2022-09-02 NOTE — PHYSICAL EXAM
[Restricted in physically strenuous activity but ambulatory and able to carry out work of a light or sedentary nature] : Status 1- Restricted in physically strenuous activity but ambulatory and able to carry out work of a light or sedentary nature, e.g., light house work, office work [Normal] : affect appropriate [de-identified] : Hyperpigmented area in the left lower eyelid [de-identified] : +mediport without any swelling, redness. [de-identified] : rash with scabs from scratching over the forearms and feet

## 2022-09-09 ENCOUNTER — APPOINTMENT (OUTPATIENT)
Dept: RADIATION ONCOLOGY | Facility: CLINIC | Age: 42
End: 2022-09-09

## 2022-09-09 VITALS
OXYGEN SATURATION: 99 % | SYSTOLIC BLOOD PRESSURE: 137 MMHG | RESPIRATION RATE: 18 BRPM | DIASTOLIC BLOOD PRESSURE: 83 MMHG | HEART RATE: 86 BPM | HEIGHT: 60 IN

## 2022-09-09 PROCEDURE — 99205 OFFICE O/P NEW HI 60 MIN: CPT | Mod: 25

## 2022-09-09 NOTE — VITALS
[Maximal Pain Intensity: 0/10] : 0/10 [Least Pain Intensity: 0/10] : 0/10 [90: Able to carry normal activity; minor signs or symptoms of disease.] : 90: Able to carry normal activity; minor signs or symptoms of disease.  [Date: ____________] : Patient's last distress assessment performed on [unfilled]. [5 - Distress Level] : Distress Level: 5 [Referred Patient  to social work for follow-up] : Patient was referred to social work for follow-up

## 2022-09-12 NOTE — HISTORY OF PRESENT ILLNESS
[FreeTextEntry1] :  is 41 year old premenopausal female, who was diagnosed with invasive ductal carcinoma of left breast, ER/CA +, Her2 neg, status post lumpectomy and SLNS, referred for a consultation to discuss radiation therapy. \par \par Ms. Stout underwent screening mammogram (11/2021): 2 well-circumscribed solid nodules reported at 12:00 in the left breast 3 cm from the nipple and several well-circumscribed solid and or cyst with low level echoes reported at 11:00, 12:00 and 3:00 in left breast for which six-month follow-up ultrasound was recommended. \par \par She underwent left stereotactic core biopsy (11/22/21) and pathology revealed poorly differentiated ductal carcinoma that was ER/% positive, and HER 2 neg with Ki-67 of 15%.\par \par Breast MRI (12/8/22) demonstrated at 9:00 in the left breast there was an enhancing mass with washout kinetics consistent with the known malignancy with a metallic marker located at the inferior medial margin of the mass.\par \par On 1/21/22, Left stereotactic core needle biopsy and right MRI guided core needle biopsy indicated no evidence of malignancy.\par \par On 3/18/2022, she underwent left partial mastectomy with five margin excisions and left axillary sentinel lymph node biopsies.\par Pathology revealed: 2.3 cm INVASIVE DUCTAL CARCINOMA, Grade:  8/9, Ductal Carcinoma In Situ:  Present. Nuclear Grade:  Intermediate. Lymphovascular Invasion:  Present. Final Margins for Invasive Carcinoma:  Uninvolved.  Distance from closest margin:  2 mm lateral.  Distance from other margins:  5 mm superior, 6 mm anterior and medial, 7 mm inferior  and >10 mm posterior. Final Margins for DCIS:  Uninvolved as above. Regional Lymph Node Status:  Four axillary sentinel nodes negative H E and IHC. Biomarker Results: Estrogen Receptor:  99% 2-3+ (positive) / Progesterone Receptor:  95% 2-3+ (positive) /  HER2:  1+ (negative) / Ki67: 75% (high proliferation). Pathologic Stage Classification (AJCC 8th ed.):  p T2 N0(i-)(sn)\par \par She completed completed ddAC x 4 cycles (4/22/22 - 6/3/22) and 12 cycles of Taxol completed on 9/2/22. She reports she tolerated chemotherapy well but feels fatigued. \par \par GYN History: \par Age at Menarche - 15\par Age at first pregnancy - 26\par Total number of pregnancies - 2 (15 y/o son and 14 y/o daughter)\par Breast feeding - yes\par OC pills - yes in her teens (15 to 21 y/o )\par HRT - None\par LMP: June 2022\par \par Family History: mother, thyroid cancer, 50's \par \par Of note, Has h/o "benign brain tumor" was resected at [facility] by Dr Ceja in 11/2005. \par \par Ms. Stout and her brother are present to discuss adjuvant radiation therapy to the left breast.

## 2022-09-12 NOTE — PHYSICAL EXAM
[] : no respiratory distress [Abdomen Soft] : soft [Nondistended] : nondistended [Abdomen Tenderness] : non-tender [Cervical Lymph Nodes Enlarged Posterior Bilaterally] : posterior cervical [Cervical Lymph Nodes Enlarged Anterior Bilaterally] : anterior cervical [Supraclavicular Lymph Nodes Enlarged Bilaterally] : supraclavicular [Axillary Lymph Nodes Enlarged Bilaterally] : axillary [Normal] : oriented to person, place and time, the affect was normal, the mood was normal and not anxious [de-identified] : Alopecia [de-identified] : Symmetric; bilateral implants; well healed surgical incision left breast; no palpable masses [de-identified] : well healed incision left axilla

## 2022-09-30 ENCOUNTER — APPOINTMENT (OUTPATIENT)
Dept: HEMATOLOGY ONCOLOGY | Facility: CLINIC | Age: 42
End: 2022-09-30

## 2022-09-30 ENCOUNTER — RESULT REVIEW (OUTPATIENT)
Age: 42
End: 2022-09-30

## 2022-09-30 PROCEDURE — 36415 COLL VENOUS BLD VENIPUNCTURE: CPT

## 2022-09-30 PROCEDURE — 99215 OFFICE O/P EST HI 40 MIN: CPT | Mod: 25

## 2022-09-30 NOTE — PHYSICAL EXAM
[Restricted in physically strenuous activity but ambulatory and able to carry out work of a light or sedentary nature] : Status 1- Restricted in physically strenuous activity but ambulatory and able to carry out work of a light or sedentary nature, e.g., light house work, office work [Normal] : affect appropriate [de-identified] : Hyperpigmented area in the left lower eyelid [de-identified] : +mediport without any swelling, redness. [de-identified] : rash with scabs from scratching over the forearms and feet
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
## 2022-10-07 ENCOUNTER — NON-APPOINTMENT (OUTPATIENT)
Age: 42
End: 2022-10-07

## 2022-10-07 NOTE — REVIEW OF SYSTEMS
[Fatigue: Grade 1 - Fatigue relieved by rest] : Fatigue: Grade 1 - Fatigue relieved by rest [Breast Pain: Grade 0] : Breast Pain: Grade 0 [Pruritus: Grade 0] : Pruritus: Grade 0 [Skin Hyperpigmentation: Grade 0] : Skin Hyperpigmentation: Grade 0 [Dermatitis Radiation: Grade 0] : Dermatitis Radiation: Grade 0

## 2022-10-07 NOTE — PHYSICAL EXAM
[Cervical Lymph Nodes Enlarged Posterior Bilaterally] : posterior cervical [Cervical Lymph Nodes Enlarged Anterior Bilaterally] : anterior cervical [Supraclavicular Lymph Nodes Enlarged Bilaterally] : supraclavicular [Axillary Lymph Nodes Enlarged Bilaterally] : axillary [Normal] : no focal deficits [de-identified] : Alopecia [de-identified] : No skin changes RT field left breast

## 2022-10-07 NOTE — HISTORY OF PRESENT ILLNESS
[FreeTextEntry1] : Ms. Stout is present for OTV. SHe is statu post fraction 7 / 20 of radiation to the left breast.

## 2022-10-07 NOTE — DISEASE MANAGEMENT
[Pathological] : TNM Stage: p [IB] : IB [TTNM] : 2 [NTNM] : 0 (i-)(sn) [MTNM] : x [de-identified] : 1590 cGy [de-identified] : 5240 cGy [de-identified] : left breast

## 2022-10-14 ENCOUNTER — NON-APPOINTMENT (OUTPATIENT)
Age: 42
End: 2022-10-14

## 2022-10-14 VITALS
HEART RATE: 85 BPM | OXYGEN SATURATION: 99 % | SYSTOLIC BLOOD PRESSURE: 147 MMHG | RESPIRATION RATE: 18 BRPM | DIASTOLIC BLOOD PRESSURE: 97 MMHG

## 2022-10-14 NOTE — PHYSICAL EXAM
[Cervical Lymph Nodes Enlarged Posterior Bilaterally] : posterior cervical [Cervical Lymph Nodes Enlarged Anterior Bilaterally] : anterior cervical [Supraclavicular Lymph Nodes Enlarged Bilaterally] : supraclavicular [Axillary Lymph Nodes Enlarged Bilaterally] : axillary [Normal] : no joint swelling, no clubbing or cyanosis of the fingernails and muscle strength and tone were normal [de-identified] : Alopecia [de-identified] : No skin changes left breast

## 2022-10-14 NOTE — HISTORY OF PRESENT ILLNESS
[FreeTextEntry1] : Ms. Stout is present for OTV. She is status post fraction 11 / 20 of radiation to the left breast. She has very faint hyperpigmentation to the left breast treated area. She reports grade 1 fatigue. She reports she is doing well otherwise and will continue treatment as planned.

## 2022-10-14 NOTE — DISEASE MANAGEMENT
[Pathological] : TNM Stage: p [IB] : IB [TTNM] : 2 [NTNM] : 0 (i-)(sn) [MTNM] : x [de-identified] : 2915 cGy cGy [de-identified] : 5240 cGy [de-identified] : left breast

## 2022-10-21 ENCOUNTER — NON-APPOINTMENT (OUTPATIENT)
Age: 42
End: 2022-10-21

## 2022-10-21 VITALS
OXYGEN SATURATION: 99 % | DIASTOLIC BLOOD PRESSURE: 79 MMHG | SYSTOLIC BLOOD PRESSURE: 115 MMHG | RESPIRATION RATE: 18 BRPM | HEART RATE: 85 BPM

## 2022-10-21 NOTE — REVIEW OF SYSTEMS
[Fatigue: Grade 1 - Fatigue relieved by rest] : Fatigue: Grade 1 - Fatigue relieved by rest [Breast Pain: Grade 0] : Breast Pain: Grade 0 [Pruritus: Grade 0] : Pruritus: Grade 0 [Skin Hyperpigmentation: Grade 0] : Skin Hyperpigmentation: Grade 0 [Dermatitis Radiation: Grade 1 - Faint erythema or dry desquamation] : Dermatitis Radiation: Grade 1 - Faint erythema or dry desquamation

## 2022-10-21 NOTE — PHYSICAL EXAM
[Normal] : oriented to person, place and time, the affect was normal, the mood was normal and not anxious [] : no respiratory distress [de-identified] : Alopecia [de-identified] : Faint erythema left breast

## 2022-10-21 NOTE — HISTORY OF PRESENT ILLNESS
[FreeTextEntry1] : Ms. Stout is present for OTV. She is status post fraction 16 / 20 of radiation to the left breast. She has mild erythema to the treated area. She is fatigued at time. She continues to work full-time. She is otherwise doing well. She is scheduled to complete treatment on 10/27/22.

## 2022-10-21 NOTE — DISEASE MANAGEMENT
[Pathological] : TNM Stage: p [IB] : IB [TTNM] : 2 [NTNM] : 0 (i-)(sn) [MTNM] : x [de-identified] : 4240 cGy cGy [de-identified] : 5240 cGy [de-identified] : left breast

## 2022-11-14 ENCOUNTER — APPOINTMENT (OUTPATIENT)
Dept: BREAST CENTER | Facility: CLINIC | Age: 42
End: 2022-11-14

## 2022-11-14 ENCOUNTER — RESULT REVIEW (OUTPATIENT)
Age: 42
End: 2022-11-14

## 2022-11-14 VITALS
DIASTOLIC BLOOD PRESSURE: 65 MMHG | BODY MASS INDEX: 21.71 KG/M2 | SYSTOLIC BLOOD PRESSURE: 105 MMHG | OXYGEN SATURATION: 96 % | WEIGHT: 115 LBS | HEIGHT: 61 IN | HEART RATE: 88 BPM

## 2022-11-14 DIAGNOSIS — N63.0 UNSPECIFIED LUMP IN UNSPECIFIED BREAST: ICD-10-CM

## 2022-11-14 DIAGNOSIS — N60.11 DIFFUSE CYSTIC MASTOPATHY OF LEFT BREAST: ICD-10-CM

## 2022-11-14 DIAGNOSIS — R92.2 INCONCLUSIVE MAMMOGRAM: ICD-10-CM

## 2022-11-14 DIAGNOSIS — D22.9 MELANOCYTIC NEVI, UNSPECIFIED: ICD-10-CM

## 2022-11-14 DIAGNOSIS — N60.12 DIFFUSE CYSTIC MASTOPATHY OF LEFT BREAST: ICD-10-CM

## 2022-11-14 PROCEDURE — 99214 OFFICE O/P EST MOD 30 MIN: CPT

## 2022-11-14 NOTE — HISTORY OF PRESENT ILLNESS
[FreeTextEntry1] : 2/22 left partial mastectomy with sentinel node biopsy\par Poorly differentiated ductal carcinoma, 23 mm, ER/VT positive, HER-2 negative, Ki-67 of 75%\par Margins were negative, 0/4 nodes\par T2 N0 M0, stage IIa\par Chemotherapy, radiation\par \par Patient denied any breast masses or bone pain.  She had a recent mammogram and ultrasound that was unremarkable.  She is being followed by medical oncology.  Recently she noticed a pigmented area on her left eye lower lid.  She went to dermatologist but they are too concerned about biopsying it because it's on an eyelid.\par \par 41-year-old premenopausal woman presents after getting her first mammogram and ultrasound which showed some pleomorphic calcifications in the central left breast.  Ultrasound showed multiple cysts and benign nodules, BI-RADS 3.  Patient denied any breast masses or nipple discharge or bone pain at this time.  Patient underwent a left stereotactic core biopsy which revealed a poorly differentiated ductal carcinoma that was ER/VT positive HER-2 negative with a Ki-67 of 15%.  Patient has no family history of breast cancer although she says she is not sure that they would have told her if she had.  2017 she had a polypectomy for uterine polyps without malignancy.  This is the patient's first breast biopsy and she is never taken hormones.  2016 she had bilateral saline implants placed.\par \par Since last seeing the patient patient gene tested negative, had a left breast stereotactic core biopsy, benign and a right breast MRI guided core biopsy benign.  Patient comes in now for discussion of treating her breast cancer.\par

## 2022-11-14 NOTE — PHYSICAL EXAM
[No Supraclavicular Adenopathy] : no supraclavicular adenopathy [No Cervical Adenopathy] : no cervical adenopathy [Clear to Auscultation Bilat] : clear to auscultation bilaterally [Examined in the supine and seated position] : examined in the supine and seated position [Symmetrical] : symmetrical [No dominant masses] : no dominant masses in right breast  [No dominant masses] : no dominant masses left breast [No Nipple Retraction] : no left nipple retraction [No Nipple Discharge] : no left nipple discharge [No Axillary Lymphadenopathy] : no left axillary lymphadenopathy [No Rashes] : no rashes [de-identified] : In the lower eyelid of the left eye there is a 10 mm pigmented lesion that is very dark with an additional lateral lighter pigmented lesion.

## 2022-11-17 ENCOUNTER — APPOINTMENT (OUTPATIENT)
Dept: HEMATOLOGY ONCOLOGY | Facility: CLINIC | Age: 42
End: 2022-11-17

## 2022-11-17 ENCOUNTER — RESULT REVIEW (OUTPATIENT)
Age: 42
End: 2022-11-17

## 2022-11-17 VITALS
WEIGHT: 119.7 LBS | TEMPERATURE: 98 F | DIASTOLIC BLOOD PRESSURE: 88 MMHG | HEART RATE: 76 BPM | SYSTOLIC BLOOD PRESSURE: 151 MMHG | HEIGHT: 61 IN | OXYGEN SATURATION: 98 % | RESPIRATION RATE: 16 BRPM | BODY MASS INDEX: 22.6 KG/M2

## 2022-11-17 DIAGNOSIS — M54.2 CERVICALGIA: ICD-10-CM

## 2022-11-17 PROCEDURE — 99214 OFFICE O/P EST MOD 30 MIN: CPT | Mod: 25

## 2022-11-17 PROCEDURE — 36415 COLL VENOUS BLD VENIPUNCTURE: CPT
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
## 2022-11-17 NOTE — PHYSICAL EXAM
[de-identified] : deferred - was examined by Dr. Marinelli three days ago.  [de-identified] : rash with scabs from scratching over the forearms and feet
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
## 2022-12-27 ENCOUNTER — NON-APPOINTMENT (OUTPATIENT)
Age: 42
End: 2022-12-27

## 2023-01-24 ENCOUNTER — RESULT REVIEW (OUTPATIENT)
Age: 43
End: 2023-01-24

## 2023-01-24 ENCOUNTER — APPOINTMENT (OUTPATIENT)
Dept: HEMATOLOGY ONCOLOGY | Facility: CLINIC | Age: 43
End: 2023-01-24
Payer: COMMERCIAL

## 2023-01-24 VITALS
OXYGEN SATURATION: 97 % | SYSTOLIC BLOOD PRESSURE: 117 MMHG | HEART RATE: 97 BPM | BODY MASS INDEX: 23.11 KG/M2 | HEIGHT: 61 IN | DIASTOLIC BLOOD PRESSURE: 77 MMHG | TEMPERATURE: 97.6 F | WEIGHT: 122.38 LBS | RESPIRATION RATE: 16 BRPM

## 2023-01-24 PROCEDURE — 36415 COLL VENOUS BLD VENIPUNCTURE: CPT

## 2023-01-24 PROCEDURE — 99215 OFFICE O/P EST HI 40 MIN: CPT | Mod: 25
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
## 2023-01-24 NOTE — PHYSICAL EXAM
[Normal] : normal appearance, no rash, nodules, vesicles, ulcers, erythema [de-identified] : Left lumpectomy scar with mild tenderness. No signs of infection. B/L implants. No palpable masses or axillary nodes bilaterally
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
## 2023-01-25 ENCOUNTER — APPOINTMENT (OUTPATIENT)
Dept: HEMATOLOGY ONCOLOGY | Facility: CLINIC | Age: 43
End: 2023-01-25
Payer: COMMERCIAL

## 2023-04-26 ENCOUNTER — RESULT REVIEW (OUTPATIENT)
Age: 43
End: 2023-04-26

## 2023-04-26 ENCOUNTER — APPOINTMENT (OUTPATIENT)
Dept: HEMATOLOGY ONCOLOGY | Facility: CLINIC | Age: 43
End: 2023-04-26
Payer: COMMERCIAL

## 2023-04-26 VITALS
HEIGHT: 61 IN | OXYGEN SATURATION: 100 % | DIASTOLIC BLOOD PRESSURE: 81 MMHG | WEIGHT: 123.44 LBS | TEMPERATURE: 95.9 F | RESPIRATION RATE: 16 BRPM | BODY MASS INDEX: 23.3 KG/M2 | HEART RATE: 77 BPM | SYSTOLIC BLOOD PRESSURE: 132 MMHG

## 2023-04-26 PROCEDURE — 36415 COLL VENOUS BLD VENIPUNCTURE: CPT

## 2023-04-26 PROCEDURE — 99214 OFFICE O/P EST MOD 30 MIN: CPT | Mod: 25
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
## 2023-04-26 NOTE — PHYSICAL EXAM
[Restricted in physically strenuous activity but ambulatory and able to carry out work of a light or sedentary nature] : Status 1- Restricted in physically strenuous activity but ambulatory and able to carry out work of a light or sedentary nature, e.g., light house work, office work [Normal] : affect appropriate [de-identified] : Left lumpectomy scar with mild tenderness. No signs of infection. B/L implants. No palpable masses or axillary nodes bilaterally
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
## 2023-05-08 ENCOUNTER — APPOINTMENT (OUTPATIENT)
Dept: BREAST CENTER | Facility: CLINIC | Age: 43
End: 2023-05-08
Payer: COMMERCIAL

## 2023-05-08 VITALS
WEIGHT: 120 LBS | HEIGHT: 61 IN | BODY MASS INDEX: 22.66 KG/M2 | SYSTOLIC BLOOD PRESSURE: 116 MMHG | TEMPERATURE: 96.8 F | OXYGEN SATURATION: 98 % | HEART RATE: 73 BPM | DIASTOLIC BLOOD PRESSURE: 83 MMHG

## 2023-05-08 DIAGNOSIS — Z98.82 BREAST IMPLANT STATUS: ICD-10-CM

## 2023-05-08 PROCEDURE — 99213 OFFICE O/P EST LOW 20 MIN: CPT

## 2023-05-08 NOTE — HISTORY OF PRESENT ILLNESS
[FreeTextEntry1] : 2/22 left partial mastectomy with sentinel node biopsy\par Poorly differentiated ductal carcinoma, 23 mm, ER/[place] positive, HER-2 negative, Ki-67 of 75%\par Margins were negative, 0/4 nodes\par T2 N0 M0, stage IIa, MammaPrint high risk luminal, F LE X trial\par Chemotherapy, radiation, ovarian suppression and letrozole\par \par Patient denied any breast masses or bone pain.  Patient is taking and tolerating her letrozole and is very active with exercise.  Patient has silicone implants that been in for 7 years. She is being followed by medical oncology.  \par \par Recently she noticed a pigmented area on her left eye lower lid.  She had her pigmented area biopsied which was benign.\par \par 41-year-old premenopausal woman presents after getting her first mammogram and ultrasound which showed some pleomorphic calcifications in the central left breast.  Ultrasound showed multiple cysts and benign nodules, BI-RADS 3.  Patient denied any breast masses or nipple discharge or bone pain at this time.  Patient underwent a left stereotactic core biopsy which revealed a poorly differentiated ductal carcinoma that was ER/[place] positive HER-2 negative with a Ki-67 of 15%.  Patient has no family history of breast cancer although she says she is not sure that they would have told her if she had.  2017 she had a polypectomy for uterine polyps without malignancy.  This is the patient's first breast biopsy and she is never taken hormones.  2016 she had bilateral saline implants placed.\par \par Since last seeing the patient patient gene tested negative, had a left breast stereotactic core biopsy, benign and a right breast MRI guided core biopsy benign.  Patient comes in now for discussion of treating her breast cancer.\par

## 2023-05-08 NOTE — PHYSICAL EXAM
[No Supraclavicular Adenopathy] : no supraclavicular adenopathy [No Cervical Adenopathy] : no cervical adenopathy [Clear to Auscultation Bilat] : clear to auscultation bilaterally [Examined in the supine and seated position] : examined in the supine and seated position [Symmetrical] : symmetrical [No dominant masses] : no dominant masses in right breast  [No dominant masses] : no dominant masses left breast [No Nipple Retraction] : no left nipple retraction [No Nipple Discharge] : no left nipple discharge [No Axillary Lymphadenopathy] : no left axillary lymphadenopathy [No Rashes] : no rashes [de-identified] : In the lower eyelid of the left eye there is a 10 mm pigmented lesion that is very dark with an additional lateral lighter pigmented lesion.

## 2023-05-12 ENCOUNTER — RESULT REVIEW (OUTPATIENT)
Age: 43
End: 2023-05-12

## 2023-05-24 ENCOUNTER — RESULT REVIEW (OUTPATIENT)
Age: 43
End: 2023-05-24

## 2023-06-22 ENCOUNTER — NON-APPOINTMENT (OUTPATIENT)
Age: 43
End: 2023-06-22

## 2023-06-22 ENCOUNTER — APPOINTMENT (OUTPATIENT)
Dept: NEUROLOGY | Facility: CLINIC | Age: 43
End: 2023-06-22
Payer: COMMERCIAL

## 2023-06-22 VITALS
DIASTOLIC BLOOD PRESSURE: 84 MMHG | SYSTOLIC BLOOD PRESSURE: 117 MMHG | HEART RATE: 73 BPM | BODY MASS INDEX: 22.66 KG/M2 | HEIGHT: 61 IN | WEIGHT: 120 LBS

## 2023-06-22 DIAGNOSIS — Z87.898 PERSONAL HISTORY OF OTHER SPECIFIED CONDITIONS: ICD-10-CM

## 2023-06-22 DIAGNOSIS — Z83.49 FAMILY HISTORY OF OTHER ENDOCRINE, NUTRITIONAL AND METABOLIC DISEASES: ICD-10-CM

## 2023-06-22 PROCEDURE — 99205 OFFICE O/P NEW HI 60 MIN: CPT

## 2023-06-22 RX ORDER — ONDANSETRON 4 MG/1
4 TABLET, ORALLY DISINTEGRATING ORAL
Qty: 30 | Refills: 2 | Status: DISCONTINUED | COMMUNITY
Start: 2022-04-22 | End: 2023-06-22

## 2023-06-22 RX ORDER — TRAMADOL HYDROCHLORIDE 50 MG/1
50 TABLET, COATED ORAL
Qty: 20 | Refills: 0 | Status: DISCONTINUED | COMMUNITY
Start: 2022-04-22 | End: 2023-06-22

## 2023-06-22 RX ORDER — LINACLOTIDE 72 UG/1
72 CAPSULE, GELATIN COATED ORAL
Qty: 30 | Refills: 0 | Status: DISCONTINUED | COMMUNITY
Start: 2022-05-20 | End: 2023-06-22

## 2023-06-22 RX ORDER — DEXAMETHASONE 4 MG/1
4 TABLET ORAL
Qty: 4 | Refills: 2 | Status: DISCONTINUED | COMMUNITY
Start: 2022-05-06 | End: 2023-06-22

## 2023-06-22 RX ORDER — OXYCODONE AND ACETAMINOPHEN 5; 325 MG/1; MG/1
5-325 TABLET ORAL
Qty: 20 | Refills: 0 | Status: DISCONTINUED | COMMUNITY
Start: 2022-02-04 | End: 2023-06-22

## 2023-06-26 PROBLEM — Z83.49 FAMILY HISTORY OF THYROID DISEASE: Status: ACTIVE | Noted: 2023-06-26

## 2023-06-28 NOTE — ASSESSMENT
[FreeTextEntry1] : Please get MRI brain and MRI cervical spine\par Drink water before and after \par Call me afterwards to discuss results \par I will send gabapentin to pharmacy, you can take it as needed 3 times a day\par It can cause drowsiness, caution when driving

## 2023-06-28 NOTE — PHYSICAL EXAM
[FreeTextEntry1] : Mental Status: AxOx3, euthymic affect, difficulty with serial sevens. Can spell "world" backwards. Short term memory: immediate 3/3, after 3 minutes 3/3\par Language/Speech : speech fluent \par Cranial Nerves \par II: Pupils equal and reactive,  VFF full\par III, IV, VI: EOMI\par V : diminished sensation in right V1-V3 to light touch\par VII : no asymmetry, no nasolabial fold flattening\par VIII: normal hearing to voice \par IX, X: normal palatal elevation, uvula midline\par XI: 5/5 head turn and 5/5 shoulder shrug bilaterally\par XII : midline tongue protrusion\par Motor: no drift in limbs, normal bulk and tone, 5/5 in all extremities\par Sensory: normal to light touch, pinprick and vibration\par Reflexes: brachioradialis, biceps, triceps, patella and ankles symmetric throughout \par Toes are down-going \par Coordination: Normal finger to nose\par Gait: normal balance and gait \par \par
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
## 2023-06-28 NOTE — DISCUSSION/SUMMARY
[FreeTextEntry1] : Jennifer is a pleasant 42-year-old RH woman with a history of recently diagnosed invasive ductal carcinoma of the left breast s/p chemotherapy and radiation and incidentally diagnosed meningioma status post resection at age 25  (left retro-orbital?) presenting with neck pain and pain behind left ear. WIll image brain and cervical spine.

## 2023-07-15 ENCOUNTER — RESULT REVIEW (OUTPATIENT)
Age: 43
End: 2023-07-15

## 2023-07-19 ENCOUNTER — RESULT REVIEW (OUTPATIENT)
Age: 43
End: 2023-07-19

## 2023-07-19 ENCOUNTER — APPOINTMENT (OUTPATIENT)
Dept: HEMATOLOGY ONCOLOGY | Facility: CLINIC | Age: 43
End: 2023-07-19

## 2023-07-19 VITALS
HEIGHT: 61 IN | TEMPERATURE: 97.9 F | RESPIRATION RATE: 16 BRPM | WEIGHT: 126 LBS | BODY MASS INDEX: 23.79 KG/M2 | HEART RATE: 73 BPM | OXYGEN SATURATION: 99 % | DIASTOLIC BLOOD PRESSURE: 84 MMHG | SYSTOLIC BLOOD PRESSURE: 143 MMHG

## 2023-07-19 NOTE — RESULTS/DATA
[FreeTextEntry1] : Labs ordered, drawn in the office, reviewed, analyzed and discussed\par 
Patient with positive blood cultures, given multiple positive blood cultures, treat as bacteriemia, resend blood cultures, check labs.
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
## 2023-07-19 NOTE — PHYSICAL EXAM
[Restricted in physically strenuous activity but ambulatory and able to carry out work of a light or sedentary nature] : Status 1- Restricted in physically strenuous activity but ambulatory and able to carry out work of a light or sedentary nature, e.g., light house work, office work [Normal] : affect appropriate [de-identified] : Left lumpectomy scar with mild tenderness. No signs of infection. B/L implants. No palpable masses or axillary nodes bilaterally
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
## 2023-09-15 ENCOUNTER — NON-APPOINTMENT (OUTPATIENT)
Age: 43
End: 2023-09-15

## 2023-10-11 ENCOUNTER — RESULT REVIEW (OUTPATIENT)
Age: 43
End: 2023-10-11

## 2023-10-11 ENCOUNTER — APPOINTMENT (OUTPATIENT)
Dept: HEMATOLOGY ONCOLOGY | Facility: CLINIC | Age: 43
End: 2023-10-11
Payer: COMMERCIAL

## 2023-10-11 VITALS
DIASTOLIC BLOOD PRESSURE: 75 MMHG | HEIGHT: 61 IN | TEMPERATURE: 97.1 F | OXYGEN SATURATION: 97 % | BODY MASS INDEX: 23.52 KG/M2 | SYSTOLIC BLOOD PRESSURE: 113 MMHG | WEIGHT: 124.56 LBS | RESPIRATION RATE: 16 BRPM | HEART RATE: 97 BPM

## 2023-10-11 DIAGNOSIS — R63.5 ABNORMAL WEIGHT GAIN: ICD-10-CM

## 2023-10-11 PROCEDURE — 99215 OFFICE O/P EST HI 40 MIN: CPT | Mod: 25

## 2023-10-11 PROCEDURE — 36415 COLL VENOUS BLD VENIPUNCTURE: CPT

## 2023-11-22 ENCOUNTER — RESULT REVIEW (OUTPATIENT)
Age: 43
End: 2023-11-22

## 2023-11-22 ENCOUNTER — APPOINTMENT (OUTPATIENT)
Dept: BREAST CENTER | Facility: CLINIC | Age: 43
End: 2023-11-22
Payer: COMMERCIAL

## 2023-11-22 VITALS — HEIGHT: 61 IN | BODY MASS INDEX: 22.66 KG/M2 | WEIGHT: 120 LBS

## 2023-11-22 PROCEDURE — 99213 OFFICE O/P EST LOW 20 MIN: CPT

## 2023-12-07 ENCOUNTER — NON-APPOINTMENT (OUTPATIENT)
Age: 43
End: 2023-12-07

## 2024-01-03 ENCOUNTER — APPOINTMENT (OUTPATIENT)
Dept: HEMATOLOGY ONCOLOGY | Facility: CLINIC | Age: 44
End: 2024-01-03
Payer: COMMERCIAL

## 2024-01-03 ENCOUNTER — RESULT REVIEW (OUTPATIENT)
Age: 44
End: 2024-01-03

## 2024-01-03 VITALS
DIASTOLIC BLOOD PRESSURE: 84 MMHG | RESPIRATION RATE: 16 BRPM | HEIGHT: 61 IN | OXYGEN SATURATION: 99 % | BODY MASS INDEX: 22.94 KG/M2 | WEIGHT: 121.5 LBS | HEART RATE: 68 BPM | TEMPERATURE: 98 F | SYSTOLIC BLOOD PRESSURE: 144 MMHG

## 2024-01-03 DIAGNOSIS — Z78.9 OTHER SPECIFIED HEALTH STATUS: ICD-10-CM

## 2024-01-03 PROCEDURE — 36415 COLL VENOUS BLD VENIPUNCTURE: CPT

## 2024-01-03 PROCEDURE — 99215 OFFICE O/P EST HI 40 MIN: CPT | Mod: 25
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
## 2024-01-03 NOTE — PHYSICAL EXAM
[Restricted in physically strenuous activity but ambulatory and able to carry out work of a light or sedentary nature] : Status 1- Restricted in physically strenuous activity but ambulatory and able to carry out work of a light or sedentary nature, e.g., light house work, office work [Normal] : affect appropriate [de-identified] : Left lumpectomy scar healed well. B/L implants. No palpable masses or axillary nodes bilaterally
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
## 2024-01-31 ENCOUNTER — NON-APPOINTMENT (OUTPATIENT)
Age: 44
End: 2024-01-31

## 2024-03-19 RX ORDER — LETROZOLE TABLETS 2.5 MG/1
2.5 TABLET, FILM COATED ORAL DAILY
Qty: 30 | Refills: 6 | Status: ACTIVE | COMMUNITY
Start: 2022-11-17 | End: 1900-01-01

## 2024-03-27 ENCOUNTER — RESULT REVIEW (OUTPATIENT)
Age: 44
End: 2024-03-27

## 2024-03-27 ENCOUNTER — APPOINTMENT (OUTPATIENT)
Dept: HEMATOLOGY ONCOLOGY | Facility: CLINIC | Age: 44
End: 2024-03-27
Payer: COMMERCIAL

## 2024-03-27 VITALS
SYSTOLIC BLOOD PRESSURE: 102 MMHG | HEART RATE: 85 BPM | HEIGHT: 61 IN | BODY MASS INDEX: 23.29 KG/M2 | WEIGHT: 123.38 LBS | DIASTOLIC BLOOD PRESSURE: 71 MMHG | OXYGEN SATURATION: 98 % | RESPIRATION RATE: 16 BRPM | TEMPERATURE: 96.7 F

## 2024-03-27 DIAGNOSIS — M25.531 PAIN IN RIGHT WRIST: ICD-10-CM

## 2024-03-27 DIAGNOSIS — M25.572 PAIN IN RIGHT ANKLE AND JOINTS OF RIGHT FOOT: ICD-10-CM

## 2024-03-27 DIAGNOSIS — M25.521 PAIN IN RIGHT ELBOW: ICD-10-CM

## 2024-03-27 DIAGNOSIS — M25.532 PAIN IN RIGHT WRIST: ICD-10-CM

## 2024-03-27 DIAGNOSIS — M25.50 PAIN IN UNSPECIFIED JOINT: ICD-10-CM

## 2024-03-27 DIAGNOSIS — M25.522 PAIN IN RIGHT ELBOW: ICD-10-CM

## 2024-03-27 DIAGNOSIS — M25.571 PAIN IN RIGHT ANKLE AND JOINTS OF RIGHT FOOT: ICD-10-CM

## 2024-03-27 PROCEDURE — G2211 COMPLEX E/M VISIT ADD ON: CPT

## 2024-03-27 PROCEDURE — 99215 OFFICE O/P EST HI 40 MIN: CPT

## 2024-03-27 PROCEDURE — 36415 COLL VENOUS BLD VENIPUNCTURE: CPT

## 2024-03-27 NOTE — PHYSICAL EXAM
[Restricted in physically strenuous activity but ambulatory and able to carry out work of a light or sedentary nature] : Status 1- Restricted in physically strenuous activity but ambulatory and able to carry out work of a light or sedentary nature, e.g., light house work, office work [Normal] : affect appropriate [de-identified] : Left lumpectomy scar healed well. B/L implants. No palpable masses or axillary nodes bilaterally
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
## 2024-04-24 ENCOUNTER — APPOINTMENT (OUTPATIENT)
Dept: HEMATOLOGY ONCOLOGY | Facility: CLINIC | Age: 44
End: 2024-04-24
Payer: COMMERCIAL

## 2024-04-24 ENCOUNTER — RESULT REVIEW (OUTPATIENT)
Age: 44
End: 2024-04-24

## 2024-04-24 VITALS
DIASTOLIC BLOOD PRESSURE: 70 MMHG | BODY MASS INDEX: 23.23 KG/M2 | TEMPERATURE: 98.6 F | OXYGEN SATURATION: 96 % | HEIGHT: 61 IN | SYSTOLIC BLOOD PRESSURE: 119 MMHG | RESPIRATION RATE: 16 BRPM | HEART RATE: 89 BPM | WEIGHT: 123.06 LBS

## 2024-04-24 DIAGNOSIS — Z17.0 MALIGNANT NEOPLASM OF CENTRAL PORTION OF LEFT FEMALE BREAST: ICD-10-CM

## 2024-04-24 DIAGNOSIS — C50.112 MALIGNANT NEOPLASM OF CENTRAL PORTION OF LEFT FEMALE BREAST: ICD-10-CM

## 2024-04-24 PROCEDURE — G2211 COMPLEX E/M VISIT ADD ON: CPT

## 2024-04-24 PROCEDURE — 99214 OFFICE O/P EST MOD 30 MIN: CPT

## 2024-04-24 PROCEDURE — 36415 COLL VENOUS BLD VENIPUNCTURE: CPT
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
## 2024-04-24 NOTE — PHYSICAL EXAM
[Restricted in physically strenuous activity but ambulatory and able to carry out work of a light or sedentary nature] : Status 1- Restricted in physically strenuous activity but ambulatory and able to carry out work of a light or sedentary nature, e.g., light house work, office work [Normal] : affect appropriate [de-identified] : Left lumpectomy scar healed well. B/L implants. No palpable masses or axillary nodes bilaterally

## 2024-05-14 ENCOUNTER — APPOINTMENT (OUTPATIENT)
Dept: BREAST CENTER | Facility: CLINIC | Age: 44
End: 2024-05-14
Payer: COMMERCIAL

## 2024-05-14 VITALS — HEIGHT: 61 IN | WEIGHT: 123 LBS | BODY MASS INDEX: 23.22 KG/M2

## 2024-05-14 DIAGNOSIS — Z85.3 PERSONAL HISTORY OF MALIGNANT NEOPLASM OF BREAST: ICD-10-CM

## 2024-05-14 DIAGNOSIS — Z90.12 ACQUIRED ABSENCE OF LEFT BREAST AND NIPPLE: ICD-10-CM

## 2024-05-14 PROCEDURE — 99213 OFFICE O/P EST LOW 20 MIN: CPT

## 2024-05-15 PROBLEM — Z90.12 STATUS POST PARTIAL MASTECTOMY OF LEFT BREAST: Status: ACTIVE | Noted: 2022-11-14

## 2024-05-15 PROBLEM — Z85.3 PERSONAL HISTORY OF BREAST CANCER: Status: ACTIVE | Noted: 2022-11-14

## 2024-05-15 NOTE — HISTORY OF PRESENT ILLNESS
[FreeTextEntry1] : 2/22 left partial mastectomy with sentinel node biopsy Poorly differentiated ductal carcinoma, 23 mm, ER/OR positive, HER-2 negative, Ki-67 of 75% Margins were negative, 0/4 nodes T2 N0 M0, stage IIa, MammaPrint high risk luminal, F LE X trial Chemotherapy, radiation, ovarian suppression and letrozole  Patient denied any breast masses or bone pain.  Patient is taking and tolerating her letrozole and is very active with exercise.  Recent breast MRI was unremarkable, BI-RADS 2. Patient has silicone implants that been in for 7 years. She is being followed by medical oncology.    Recently she noticed a pigmented area on her left eye lower lid.  She had her pigmented area biopsied which was benign.  41-year-old premenopausal woman presents after getting her first mammogram and ultrasound which showed some pleomorphic calcifications in the central left breast.  Ultrasound showed multiple cysts and benign nodules, BI-RADS 3.  Patient denied any breast masses or nipple discharge or bone pain at this time.  Patient underwent a left stereotactic core biopsy which revealed a poorly differentiated ductal carcinoma that was ER/OR positive HER-2 negative with a Ki-67 of 15%.  Patient has no family history of breast cancer although she says she is not sure that they would have told her if she had.  2017 she had a polypectomy for uterine polyps without malignancy.  This is the patient's first breast biopsy and she is never taken hormones.  2016 she had bilateral saline implants placed.  Since last seeing the patient patient gene tested negative, had a left breast stereotactic core biopsy, benign and a right breast MRI guided core biopsy benign.  Patient comes in now for discussion of treating her breast cancer.

## 2024-05-15 NOTE — PHYSICAL EXAM
[No Supraclavicular Adenopathy] : no supraclavicular adenopathy [No Cervical Adenopathy] : no cervical adenopathy [Clear to Auscultation Bilat] : clear to auscultation bilaterally [Examined in the supine and seated position] : examined in the supine and seated position [Symmetrical] : symmetrical [No dominant masses] : no dominant masses in right breast  [No dominant masses] : no dominant masses left breast [No Nipple Retraction] : no left nipple retraction [No Nipple Discharge] : no left nipple discharge [No Axillary Lymphadenopathy] : no left axillary lymphadenopathy [No Rashes] : no rashes [de-identified] : In the lower eyelid of the left eye there is a 10 mm pigmented lesion that is very dark with an additional lateral lighter pigmented lesion.

## 2024-06-02 ENCOUNTER — NON-APPOINTMENT (OUTPATIENT)
Age: 44
End: 2024-06-02

## 2024-06-03 ENCOUNTER — APPOINTMENT (OUTPATIENT)
Dept: NEUROLOGY | Facility: CLINIC | Age: 44
End: 2024-06-03
Payer: COMMERCIAL

## 2024-06-03 VITALS
WEIGHT: 123 LBS | DIASTOLIC BLOOD PRESSURE: 100 MMHG | HEART RATE: 76 BPM | BODY MASS INDEX: 23.22 KG/M2 | SYSTOLIC BLOOD PRESSURE: 142 MMHG | HEIGHT: 61 IN | OXYGEN SATURATION: 95 %

## 2024-06-03 PROCEDURE — 99214 OFFICE O/P EST MOD 30 MIN: CPT

## 2024-06-03 RX ORDER — GOSERELIN ACETATE 10.8 MG/1
IMPLANT SUBCUTANEOUS
Refills: 0 | Status: ACTIVE | COMMUNITY

## 2024-06-03 RX ORDER — MELOXICAM 15 MG/1
15 TABLET ORAL DAILY
Qty: 30 | Refills: 0 | Status: DISCONTINUED | COMMUNITY
Start: 2022-04-27 | End: 2024-06-03

## 2024-06-03 RX ORDER — LIDOCAINE AND PRILOCAINE 25; 25 MG/G; MG/G
2.5-2.5 CREAM TOPICAL
Qty: 1 | Refills: 5 | Status: DISCONTINUED | COMMUNITY
Start: 2022-03-29 | End: 2024-06-03

## 2024-06-03 RX ORDER — IBUPROFEN 800 MG/1
800 TABLET, FILM COATED ORAL
Qty: 6 | Refills: 0 | Status: DISCONTINUED | COMMUNITY
Start: 2022-02-04 | End: 2024-06-03
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
## 2024-06-03 NOTE — DISCUSSION/SUMMARY
[FreeTextEntry1] : Jennifer is a pleasant 43-year-old RH woman with a history of recently diagnosed invasive ductal carcinoma of the left breast s/p chemotherapy and radiation and incidentally diagnosed meningioma?  status post resection at age 25. MRI brain w/wo contrast with left parietal-occipital craniotomy, left parietal encephalomalacia, no new enhancing lesions. MRI cervical spine with minimal degenerative disease. Doing well.

## 2024-06-03 NOTE — ASSESSMENT
[FreeTextEntry1] : Return to clinic in one year  You look great Drink 8 glasses of water a day, if headache, can try magnesium glycinate 200 - 400 mg at night, take every night

## 2024-06-03 NOTE — PHYSICAL EXAM
[FreeTextEntry1] : Mental Status: Alert, oriented. Gives detailed history.  Language/Speech : speech fluent  Cranial Nerves  II: Pupils equal and reactive, VFF full  III, IV, VI: EOMI  V : diminished sensation in right V1-V3 to light touch  VII : no asymmetry, no nasolabial fold flattening  VIII: normal hearing to voice  IX, X: normal palatal elevation, uvula midline  XI: 5/5 head turn and 5/5 shoulder shrug bilaterally  XII : midline tongue protrusion  Motor: no drift in limbs, normal bulk and tone, 5/5 in all extremities  Sensory: diminished sensation to light touch in right arm and right leg  Reflexes: brachioradialis, biceps, triceps, patella and ankles symmetric throughout (1+)  Toes are down-going  Coordination: Normal finger to nose  Gait: normal balance and gait

## 2024-06-03 NOTE — DATA REVIEWED
[de-identified] : 7/125/23: MRI brain w/wo contrast: s/p left posterior parietal occipital craniotomy with underlying  encephalomalacia and gliosis in posterior parietal lobe. No abnormal enhancement to suggest recurrent tumoral disease. No MTS. No acute infarction.  MRI cervical spine 7/20/23: mild degenerative changes at C6/C7.

## 2024-06-10 ENCOUNTER — RESULT REVIEW (OUTPATIENT)
Age: 44
End: 2024-06-10

## 2024-06-11 DIAGNOSIS — R93.89 ABNORMAL FINDINGS ON DIAGNOSTIC IMAGING OF OTHER SPECIFIED BODY STRUCTURES: ICD-10-CM

## 2024-07-02 ENCOUNTER — RESULT REVIEW (OUTPATIENT)
Age: 44
End: 2024-07-02

## 2024-07-17 ENCOUNTER — RESULT REVIEW (OUTPATIENT)
Age: 44
End: 2024-07-17

## 2024-07-17 ENCOUNTER — APPOINTMENT (OUTPATIENT)
Dept: HEMATOLOGY ONCOLOGY | Facility: CLINIC | Age: 44
End: 2024-07-17
Payer: COMMERCIAL

## 2024-07-17 VITALS
HEART RATE: 70 BPM | BODY MASS INDEX: 23.6 KG/M2 | HEIGHT: 61 IN | WEIGHT: 125 LBS | SYSTOLIC BLOOD PRESSURE: 142 MMHG | OXYGEN SATURATION: 99 % | TEMPERATURE: 97.6 F | DIASTOLIC BLOOD PRESSURE: 94 MMHG | RESPIRATION RATE: 16 BRPM

## 2024-07-17 DIAGNOSIS — G89.29 LOW BACK PAIN, UNSPECIFIED: ICD-10-CM

## 2024-07-17 DIAGNOSIS — M54.50 LOW BACK PAIN, UNSPECIFIED: ICD-10-CM

## 2024-07-17 DIAGNOSIS — C50.112 MALIGNANT NEOPLASM OF CENTRAL PORTION OF LEFT FEMALE BREAST: ICD-10-CM

## 2024-07-17 DIAGNOSIS — R53.83 OTHER FATIGUE: ICD-10-CM

## 2024-07-17 DIAGNOSIS — Z17.0 MALIGNANT NEOPLASM OF CENTRAL PORTION OF LEFT FEMALE BREAST: ICD-10-CM

## 2024-07-17 PROCEDURE — 99215 OFFICE O/P EST HI 40 MIN: CPT

## 2024-07-17 PROCEDURE — G2211 COMPLEX E/M VISIT ADD ON: CPT

## 2024-07-17 PROCEDURE — 36415 COLL VENOUS BLD VENIPUNCTURE: CPT

## 2024-08-14 ENCOUNTER — APPOINTMENT (OUTPATIENT)
Dept: HEMATOLOGY ONCOLOGY | Facility: CLINIC | Age: 44
End: 2024-08-14

## 2024-08-14 NOTE — PHYSICAL EXAM
[Restricted in physically strenuous activity but ambulatory and able to carry out work of a light or sedentary nature] : Status 1- Restricted in physically strenuous activity but ambulatory and able to carry out work of a light or sedentary nature, e.g., light house work, office work [Normal] : affect appropriate [de-identified] : Left lumpectomy scar healed well. B/L implants. No palpable masses or axillary nodes bilaterally
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
## 2024-08-22 ENCOUNTER — RESULT REVIEW (OUTPATIENT)
Age: 44
End: 2024-08-22

## 2024-08-22 ENCOUNTER — APPOINTMENT (OUTPATIENT)
Dept: HEMATOLOGY ONCOLOGY | Facility: CLINIC | Age: 44
End: 2024-08-22
Payer: COMMERCIAL

## 2024-08-22 VITALS
BODY MASS INDEX: 23.98 KG/M2 | WEIGHT: 127 LBS | HEIGHT: 61 IN | TEMPERATURE: 96.8 F | RESPIRATION RATE: 16 BRPM | HEART RATE: 81 BPM | DIASTOLIC BLOOD PRESSURE: 92 MMHG | SYSTOLIC BLOOD PRESSURE: 134 MMHG | OXYGEN SATURATION: 99 %

## 2024-08-22 DIAGNOSIS — C50.112 MALIGNANT NEOPLASM OF CENTRAL PORTION OF LEFT FEMALE BREAST: ICD-10-CM

## 2024-08-22 DIAGNOSIS — Z17.0 MALIGNANT NEOPLASM OF CENTRAL PORTION OF LEFT FEMALE BREAST: ICD-10-CM

## 2024-08-22 PROCEDURE — 36415 COLL VENOUS BLD VENIPUNCTURE: CPT

## 2024-08-22 PROCEDURE — 99214 OFFICE O/P EST MOD 30 MIN: CPT

## 2024-08-22 PROCEDURE — G2211 COMPLEX E/M VISIT ADD ON: CPT
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
## 2024-08-24 NOTE — PHYSICAL EXAM
[Restricted in physically strenuous activity but ambulatory and able to carry out work of a light or sedentary nature] : Status 1- Restricted in physically strenuous activity but ambulatory and able to carry out work of a light or sedentary nature, e.g., light house work, office work [Normal] : affect appropriate [de-identified] : Left lumpectomy scar healed well. B/L implants. No palpable masses or axillary nodes bilaterally
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
## 2024-08-24 NOTE — REVIEW OF SYSTEMS
[FreeTextEntry2] : 10 point review of systems negative except as outlined in HPI no anemia, no easy bruising, no jaundice, no swollen lymph nodes.

## 2024-08-24 NOTE — PHYSICAL EXAM
[Restricted in physically strenuous activity but ambulatory and able to carry out work of a light or sedentary nature] : Status 1- Restricted in physically strenuous activity but ambulatory and able to carry out work of a light or sedentary nature, e.g., light house work, office work [Normal] : affect appropriate [de-identified] : Left lumpectomy scar healed well. B/L implants. No palpable masses or axillary nodes bilaterally
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
## 2024-08-24 NOTE — PHYSICAL EXAM
[Restricted in physically strenuous activity but ambulatory and able to carry out work of a light or sedentary nature] : Status 1- Restricted in physically strenuous activity but ambulatory and able to carry out work of a light or sedentary nature, e.g., light house work, office work [Normal] : affect appropriate [de-identified] : Left lumpectomy scar healed well. B/L implants. No palpable masses or axillary nodes bilaterally

## 2024-08-24 NOTE — PHYSICAL EXAM
[Restricted in physically strenuous activity but ambulatory and able to carry out work of a light or sedentary nature] : Status 1- Restricted in physically strenuous activity but ambulatory and able to carry out work of a light or sedentary nature, e.g., light house work, office work [Normal] : affect appropriate [de-identified] : Left lumpectomy scar healed well. B/L implants. No palpable masses or axillary nodes bilaterally
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
## 2024-09-04 NOTE — PHYSICAL EXAM
[Restricted in physically strenuous activity but ambulatory and able to carry out work of a light or sedentary nature] : Status 1- Restricted in physically strenuous activity but ambulatory and able to carry out work of a light or sedentary nature, e.g., light house work, office work [Normal] : affect appropriate [de-identified] : Left lumpectomy scar healed well. B/L implants. No palpable masses or axillary nodes bilaterally
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
## 2024-09-11 ENCOUNTER — APPOINTMENT (OUTPATIENT)
Dept: HEMATOLOGY ONCOLOGY | Facility: CLINIC | Age: 44
End: 2024-09-11

## 2024-10-07 ENCOUNTER — NON-APPOINTMENT (OUTPATIENT)
Age: 44
End: 2024-10-07

## 2024-10-16 ENCOUNTER — APPOINTMENT (OUTPATIENT)
Dept: HEMATOLOGY ONCOLOGY | Facility: CLINIC | Age: 44
End: 2024-10-16

## 2024-10-16 ENCOUNTER — RESULT REVIEW (OUTPATIENT)
Age: 44
End: 2024-10-16

## 2024-10-16 VITALS
TEMPERATURE: 97.1 F | DIASTOLIC BLOOD PRESSURE: 99 MMHG | HEIGHT: 61 IN | SYSTOLIC BLOOD PRESSURE: 138 MMHG | HEART RATE: 78 BPM | WEIGHT: 126 LBS | BODY MASS INDEX: 23.79 KG/M2 | OXYGEN SATURATION: 98 % | RESPIRATION RATE: 16 BRPM

## 2024-12-09 ENCOUNTER — RESULT REVIEW (OUTPATIENT)
Age: 44
End: 2024-12-09

## 2024-12-12 ENCOUNTER — NON-APPOINTMENT (OUTPATIENT)
Age: 44
End: 2024-12-12

## 2025-01-03 ENCOUNTER — APPOINTMENT (OUTPATIENT)
Dept: BREAST CENTER | Facility: CLINIC | Age: 45
End: 2025-01-03
Payer: COMMERCIAL

## 2025-01-03 VITALS
BODY MASS INDEX: 23.98 KG/M2 | WEIGHT: 127 LBS | OXYGEN SATURATION: 97 % | HEIGHT: 61 IN | DIASTOLIC BLOOD PRESSURE: 81 MMHG | SYSTOLIC BLOOD PRESSURE: 117 MMHG | HEART RATE: 95 BPM

## 2025-01-03 DIAGNOSIS — Z90.12 ACQUIRED ABSENCE OF LEFT BREAST AND NIPPLE: ICD-10-CM

## 2025-01-03 DIAGNOSIS — R93.89 ABNORMAL FINDINGS ON DIAGNOSTIC IMAGING OF OTHER SPECIFIED BODY STRUCTURES: ICD-10-CM

## 2025-01-03 DIAGNOSIS — Z85.3 PERSONAL HISTORY OF MALIGNANT NEOPLASM OF BREAST: ICD-10-CM

## 2025-01-03 PROCEDURE — 99213 OFFICE O/P EST LOW 20 MIN: CPT

## 2025-01-08 ENCOUNTER — RESULT REVIEW (OUTPATIENT)
Age: 45
End: 2025-01-08

## 2025-01-08 ENCOUNTER — APPOINTMENT (OUTPATIENT)
Dept: HEMATOLOGY ONCOLOGY | Facility: CLINIC | Age: 45
End: 2025-01-08
Payer: COMMERCIAL

## 2025-01-08 ENCOUNTER — APPOINTMENT (OUTPATIENT)
Dept: HEMATOLOGY ONCOLOGY | Facility: CLINIC | Age: 45
End: 2025-01-08

## 2025-01-08 VITALS
RESPIRATION RATE: 16 BRPM | TEMPERATURE: 97.8 F | HEART RATE: 90 BPM | BODY MASS INDEX: 23.27 KG/M2 | WEIGHT: 123.25 LBS | OXYGEN SATURATION: 97 % | DIASTOLIC BLOOD PRESSURE: 79 MMHG | SYSTOLIC BLOOD PRESSURE: 109 MMHG | HEIGHT: 61 IN

## 2025-01-08 DIAGNOSIS — K59.00 CONSTIPATION, UNSPECIFIED: ICD-10-CM

## 2025-01-08 DIAGNOSIS — C50.112 MALIGNANT NEOPLASM OF CENTRAL PORTION OF LEFT FEMALE BREAST: ICD-10-CM

## 2025-01-08 DIAGNOSIS — Z17.0 MALIGNANT NEOPLASM OF CENTRAL PORTION OF LEFT FEMALE BREAST: ICD-10-CM

## 2025-01-08 PROCEDURE — 99215 OFFICE O/P EST HI 40 MIN: CPT

## 2025-01-08 PROCEDURE — 36415 COLL VENOUS BLD VENIPUNCTURE: CPT

## 2025-01-08 PROCEDURE — G2211 COMPLEX E/M VISIT ADD ON: CPT

## 2025-03-25 ENCOUNTER — APPOINTMENT (OUTPATIENT)
Dept: HEMATOLOGY ONCOLOGY | Facility: CLINIC | Age: 45
End: 2025-03-25

## 2025-04-02 ENCOUNTER — APPOINTMENT (OUTPATIENT)
Dept: HEMATOLOGY ONCOLOGY | Facility: CLINIC | Age: 45
End: 2025-04-02
Payer: COMMERCIAL

## 2025-04-02 ENCOUNTER — RESULT REVIEW (OUTPATIENT)
Age: 45
End: 2025-04-02

## 2025-04-02 VITALS
HEART RATE: 86 BPM | DIASTOLIC BLOOD PRESSURE: 78 MMHG | SYSTOLIC BLOOD PRESSURE: 108 MMHG | HEIGHT: 61 IN | RESPIRATION RATE: 16 BRPM | OXYGEN SATURATION: 97 % | TEMPERATURE: 97 F

## 2025-04-02 DIAGNOSIS — C50.112 MALIGNANT NEOPLASM OF CENTRAL PORTION OF LEFT FEMALE BREAST: ICD-10-CM

## 2025-04-02 DIAGNOSIS — E04.1 NONTOXIC SINGLE THYROID NODULE: ICD-10-CM

## 2025-04-02 DIAGNOSIS — Z17.0 MALIGNANT NEOPLASM OF CENTRAL PORTION OF LEFT FEMALE BREAST: ICD-10-CM

## 2025-04-02 PROCEDURE — 99215 OFFICE O/P EST HI 40 MIN: CPT

## 2025-04-02 PROCEDURE — G2211 COMPLEX E/M VISIT ADD ON: CPT

## 2025-04-02 PROCEDURE — 36415 COLL VENOUS BLD VENIPUNCTURE: CPT

## 2025-04-07 ENCOUNTER — APPOINTMENT (OUTPATIENT)
Dept: GASTROENTEROLOGY | Facility: CLINIC | Age: 45
End: 2025-04-07
Payer: COMMERCIAL

## 2025-04-07 ENCOUNTER — NON-APPOINTMENT (OUTPATIENT)
Age: 45
End: 2025-04-07

## 2025-04-07 VITALS
HEIGHT: 61 IN | SYSTOLIC BLOOD PRESSURE: 110 MMHG | WEIGHT: 120 LBS | HEART RATE: 75 BPM | BODY MASS INDEX: 22.66 KG/M2 | DIASTOLIC BLOOD PRESSURE: 68 MMHG | OXYGEN SATURATION: 98 %

## 2025-04-07 DIAGNOSIS — Z80.0 FAMILY HISTORY OF MALIGNANT NEOPLASM OF DIGESTIVE ORGANS: ICD-10-CM

## 2025-04-07 DIAGNOSIS — K59.04 CHRONIC IDIOPATHIC CONSTIPATION: ICD-10-CM

## 2025-04-07 PROCEDURE — 99203 OFFICE O/P NEW LOW 30 MIN: CPT

## 2025-04-07 RX ORDER — LINACLOTIDE 145 UG/1
145 CAPSULE, GELATIN COATED ORAL DAILY
Qty: 30 | Refills: 3 | Status: ACTIVE | COMMUNITY
Start: 2025-04-07 | End: 1900-01-01

## 2025-06-03 ENCOUNTER — APPOINTMENT (OUTPATIENT)
Dept: NEUROLOGY | Facility: CLINIC | Age: 45
End: 2025-06-03
Payer: COMMERCIAL

## 2025-06-03 VITALS
DIASTOLIC BLOOD PRESSURE: 75 MMHG | BODY MASS INDEX: 23.81 KG/M2 | OXYGEN SATURATION: 97 % | HEART RATE: 71 BPM | WEIGHT: 126 LBS | SYSTOLIC BLOOD PRESSURE: 106 MMHG

## 2025-06-03 DIAGNOSIS — H53.8 OTHER VISUAL DISTURBANCES: ICD-10-CM

## 2025-06-03 DIAGNOSIS — D32.0 BENIGN NEOPLASM OF CEREBRAL MENINGES: ICD-10-CM

## 2025-06-03 PROCEDURE — 99214 OFFICE O/P EST MOD 30 MIN: CPT

## 2025-06-20 ENCOUNTER — RESULT REVIEW (OUTPATIENT)
Age: 45
End: 2025-06-20

## 2025-06-25 ENCOUNTER — APPOINTMENT (OUTPATIENT)
Dept: HEMATOLOGY ONCOLOGY | Facility: CLINIC | Age: 45
End: 2025-06-25

## 2025-06-25 ENCOUNTER — RESULT REVIEW (OUTPATIENT)
Age: 45
End: 2025-06-25

## 2025-06-25 ENCOUNTER — APPOINTMENT (OUTPATIENT)
Dept: HEMATOLOGY ONCOLOGY | Facility: CLINIC | Age: 45
End: 2025-06-25
Payer: COMMERCIAL

## 2025-06-25 VITALS
DIASTOLIC BLOOD PRESSURE: 72 MMHG | TEMPERATURE: 97.1 F | HEIGHT: 61 IN | RESPIRATION RATE: 16 BRPM | WEIGHT: 118.19 LBS | BODY MASS INDEX: 22.31 KG/M2 | SYSTOLIC BLOOD PRESSURE: 102 MMHG | OXYGEN SATURATION: 96 % | HEART RATE: 76 BPM

## 2025-06-25 PROCEDURE — 99214 OFFICE O/P EST MOD 30 MIN: CPT

## 2025-06-25 PROCEDURE — G2211 COMPLEX E/M VISIT ADD ON: CPT

## 2025-06-25 PROCEDURE — 36415 COLL VENOUS BLD VENIPUNCTURE: CPT

## 2025-06-27 ENCOUNTER — RESULT REVIEW (OUTPATIENT)
Age: 45
End: 2025-06-27

## 2025-06-30 ENCOUNTER — RX RENEWAL (OUTPATIENT)
Age: 45
End: 2025-06-30

## 2025-07-02 ENCOUNTER — APPOINTMENT (OUTPATIENT)
Dept: HEMATOLOGY ONCOLOGY | Facility: CLINIC | Age: 45
End: 2025-07-02

## 2025-07-18 ENCOUNTER — RESULT REVIEW (OUTPATIENT)
Age: 45
End: 2025-07-18

## 2025-07-18 ENCOUNTER — APPOINTMENT (OUTPATIENT)
Dept: GASTROENTEROLOGY | Facility: CLINIC | Age: 45
End: 2025-07-18

## 2025-07-18 ENCOUNTER — NON-APPOINTMENT (OUTPATIENT)
Age: 45
End: 2025-07-18

## 2025-07-18 ENCOUNTER — APPOINTMENT (OUTPATIENT)
Dept: BREAST CENTER | Facility: CLINIC | Age: 45
End: 2025-07-18
Payer: COMMERCIAL

## 2025-07-18 VITALS
HEIGHT: 61 IN | BODY MASS INDEX: 22.09 KG/M2 | WEIGHT: 117 LBS | DIASTOLIC BLOOD PRESSURE: 74 MMHG | SYSTOLIC BLOOD PRESSURE: 120 MMHG

## 2025-07-18 VITALS
HEIGHT: 61 IN | DIASTOLIC BLOOD PRESSURE: 91 MMHG | WEIGHT: 117 LBS | BODY MASS INDEX: 22.09 KG/M2 | OXYGEN SATURATION: 98 % | SYSTOLIC BLOOD PRESSURE: 120 MMHG | HEART RATE: 75 BPM

## 2025-07-18 PROBLEM — R13.10 DYSPHAGIA: Status: ACTIVE | Noted: 2025-07-18

## 2025-07-18 PROBLEM — R10.13 EPIGASTRIC PAIN SYNDROME: Status: ACTIVE | Noted: 2025-07-18

## 2025-07-18 PROBLEM — R19.4 ENCOUNTER FOR DIAGNOSTIC COLONOSCOPY DUE TO CHANGE IN BOWEL HABITS: Status: ACTIVE | Noted: 2025-07-18

## 2025-07-18 PROCEDURE — 99213 OFFICE O/P EST LOW 20 MIN: CPT

## 2025-07-18 RX ORDER — LINACLOTIDE 72 UG/1
72 CAPSULE, GELATIN COATED ORAL
Qty: 30 | Refills: 3 | Status: ACTIVE | COMMUNITY
Start: 2025-07-18 | End: 1900-01-01

## 2025-07-18 RX ORDER — SODIUM SULFATE, POTASSIUM SULFATE AND MAGNESIUM SULFATE 1.6; 3.13; 17.5 G/177ML; G/177ML; G/177ML
17.5-3.13-1.6 SOLUTION ORAL
Qty: 2 | Refills: 0 | Status: ACTIVE | COMMUNITY
Start: 2025-07-18 | End: 1900-01-01

## 2025-07-18 RX ORDER — OMEPRAZOLE 20 MG/1
20 CAPSULE, DELAYED RELEASE ORAL DAILY
Qty: 30 | Refills: 4 | Status: ACTIVE | COMMUNITY
Start: 2025-07-18 | End: 1900-01-01

## 2025-07-26 ENCOUNTER — RESULT REVIEW (OUTPATIENT)
Age: 45
End: 2025-07-26

## 2025-09-18 ENCOUNTER — RESULT REVIEW (OUTPATIENT)
Age: 45
End: 2025-09-18

## 2025-09-18 ENCOUNTER — APPOINTMENT (OUTPATIENT)
Dept: HEMATOLOGY ONCOLOGY | Facility: CLINIC | Age: 45
End: 2025-09-18
Payer: COMMERCIAL

## 2025-09-18 VITALS
HEIGHT: 61.5 IN | HEART RATE: 91 BPM | TEMPERATURE: 97.8 F | RESPIRATION RATE: 16 BRPM | DIASTOLIC BLOOD PRESSURE: 65 MMHG | SYSTOLIC BLOOD PRESSURE: 103 MMHG | BODY MASS INDEX: 22.63 KG/M2 | OXYGEN SATURATION: 97 % | WEIGHT: 121.44 LBS

## 2025-09-18 DIAGNOSIS — C50.112 MALIGNANT NEOPLASM OF CENTRAL PORTION OF LEFT FEMALE BREAST: ICD-10-CM

## 2025-09-18 DIAGNOSIS — Z17.0 MALIGNANT NEOPLASM OF CENTRAL PORTION OF LEFT FEMALE BREAST: ICD-10-CM

## 2025-09-18 PROCEDURE — G2211 COMPLEX E/M VISIT ADD ON: CPT

## 2025-09-18 PROCEDURE — 36415 COLL VENOUS BLD VENIPUNCTURE: CPT

## 2025-09-18 PROCEDURE — 99214 OFFICE O/P EST MOD 30 MIN: CPT
